# Patient Record
Sex: FEMALE | Race: WHITE | NOT HISPANIC OR LATINO | Employment: FULL TIME | URBAN - METROPOLITAN AREA
[De-identification: names, ages, dates, MRNs, and addresses within clinical notes are randomized per-mention and may not be internally consistent; named-entity substitution may affect disease eponyms.]

---

## 2017-01-24 ENCOUNTER — MYC REFILL (OUTPATIENT)
Dept: FAMILY MEDICINE | Facility: CLINIC | Age: 52
End: 2017-01-24

## 2017-01-24 DIAGNOSIS — F33.0 MAJOR DEPRESSIVE DISORDER, RECURRENT EPISODE, MILD (H): ICD-10-CM

## 2017-01-24 NOTE — TELEPHONE ENCOUNTER
Due for appointment, one month sent, blueKiwi message sent    FUTURE APPOINTMENTS:       - Follow-up visit in 8/2016, arrive fasting for physical/labs  Prescription approved per [facility] Refill Protocol.  Gaby Dey RN, BSN

## 2017-01-24 NOTE — TELEPHONE ENCOUNTER
Message from [employer]:  Original authorizing provider: Susan Haase, APRN CNP Nancy M Pommier would like a refill of the following medications:  FLUoxetine (PROZAC) 20 MG capsule [Susan Haase, APRN CNP]    Preferred pharmacy: [address]    Comment:

## 2017-01-26 ENCOUNTER — DOCUMENTATION ONLY (OUTPATIENT)
Dept: LAB | Facility: CLINIC | Age: 52
End: 2017-01-26

## 2017-01-26 DIAGNOSIS — E03.9 HYPOTHYROIDISM, UNSPECIFIED TYPE: ICD-10-CM

## 2017-01-26 DIAGNOSIS — Z13.6 CARDIOVASCULAR SCREENING; LDL GOAL LESS THAN 160: ICD-10-CM

## 2017-01-26 DIAGNOSIS — E55.9 VITAMIN D DEFICIENCY: Primary | ICD-10-CM

## 2017-01-26 NOTE — PROGRESS NOTES
..Please review, associate diagnosis and sign pending laboratory orders for patient upcoming appointment on 01/27/2017. Thank You.

## 2017-01-27 ENCOUNTER — ALLIED HEALTH/NURSE VISIT (OUTPATIENT)
Dept: NURSING | Facility: CLINIC | Age: 52
End: 2017-01-27
Payer: COMMERCIAL

## 2017-01-27 DIAGNOSIS — Z23 NEED FOR PROPHYLACTIC VACCINATION AND INOCULATION AGAINST INFLUENZA: Primary | ICD-10-CM

## 2017-01-27 DIAGNOSIS — E03.9 HYPOTHYROIDISM, UNSPECIFIED TYPE: ICD-10-CM

## 2017-01-27 DIAGNOSIS — Z13.6 CARDIOVASCULAR SCREENING; LDL GOAL LESS THAN 160: ICD-10-CM

## 2017-01-27 DIAGNOSIS — E55.9 VITAMIN D DEFICIENCY: ICD-10-CM

## 2017-01-27 LAB
BASOPHILS # BLD AUTO: 0 10E9/L (ref 0–0.2)
BASOPHILS NFR BLD AUTO: 0.8 %
DIFFERENTIAL METHOD BLD: NORMAL
EOSINOPHIL # BLD AUTO: 0.2 10E9/L (ref 0–0.7)
EOSINOPHIL NFR BLD AUTO: 4.3 %
ERYTHROCYTE [DISTWIDTH] IN BLOOD BY AUTOMATED COUNT: 12.8 % (ref 10–15)
HCT VFR BLD AUTO: 39.1 % (ref 35–47)
HGB BLD-MCNC: 13.6 G/DL (ref 11.7–15.7)
LYMPHOCYTES # BLD AUTO: 1.4 10E9/L (ref 0.8–5.3)
LYMPHOCYTES NFR BLD AUTO: 29.2 %
MCH RBC QN AUTO: 31.4 PG (ref 26.5–33)
MCHC RBC AUTO-ENTMCNC: 34.8 G/DL (ref 31.5–36.5)
MCV RBC AUTO: 90 FL (ref 78–100)
MONOCYTES # BLD AUTO: 0.5 10E9/L (ref 0–1.3)
MONOCYTES NFR BLD AUTO: 11.1 %
NEUTROPHILS # BLD AUTO: 2.7 10E9/L (ref 1.6–8.3)
NEUTROPHILS NFR BLD AUTO: 54.6 %
PLATELET # BLD AUTO: 223 10E9/L (ref 150–450)
RBC # BLD AUTO: 4.33 10E12/L (ref 3.8–5.2)
WBC # BLD AUTO: 4.9 10E9/L (ref 4–11)

## 2017-01-27 PROCEDURE — 36415 COLL VENOUS BLD VENIPUNCTURE: CPT | Performed by: NURSE PRACTITIONER

## 2017-01-27 PROCEDURE — 90471 IMMUNIZATION ADMIN: CPT

## 2017-01-27 PROCEDURE — 84439 ASSAY OF FREE THYROXINE: CPT | Performed by: NURSE PRACTITIONER

## 2017-01-27 PROCEDURE — 99207 ZZC NO CHARGE NURSE ONLY: CPT

## 2017-01-27 PROCEDURE — 80061 LIPID PANEL: CPT | Performed by: NURSE PRACTITIONER

## 2017-01-27 PROCEDURE — 82306 VITAMIN D 25 HYDROXY: CPT | Performed by: NURSE PRACTITIONER

## 2017-01-27 PROCEDURE — 90686 IIV4 VACC NO PRSV 0.5 ML IM: CPT

## 2017-01-27 PROCEDURE — 80050 GENERAL HEALTH PANEL: CPT | Performed by: NURSE PRACTITIONER

## 2017-01-27 ASSESSMENT — ANXIETY QUESTIONNAIRES
7. FEELING AFRAID AS IF SOMETHING AWFUL MIGHT HAPPEN: SEVERAL DAYS
1. FEELING NERVOUS, ANXIOUS, OR ON EDGE: SEVERAL DAYS
6. BECOMING EASILY ANNOYED OR IRRITABLE: MORE THAN HALF THE DAYS
5. BEING SO RESTLESS THAT IT IS HARD TO SIT STILL: SEVERAL DAYS
3. WORRYING TOO MUCH ABOUT DIFFERENT THINGS: MORE THAN HALF THE DAYS
GAD7 TOTAL SCORE: 10
2. NOT BEING ABLE TO STOP OR CONTROL WORRYING: MORE THAN HALF THE DAYS

## 2017-01-27 ASSESSMENT — PATIENT HEALTH QUESTIONNAIRE - PHQ9: 5. POOR APPETITE OR OVEREATING: SEVERAL DAYS

## 2017-01-27 NOTE — PROGRESS NOTES
Injectable Influenza Immunization Documentation    1.  Is the person to be vaccinated sick today?  No    2. Does the person to be vaccinated have an allergy to eggs or to a component of the vaccine?  No    3. Has the person to be vaccinated today ever had a serious reaction to influenza vaccine in the past?  No    4. Has the person to be vaccinated ever had Guillain-Courtland syndrome?  No     Form completed by Richard Del Castillo CMA

## 2017-01-27 NOTE — PROGRESS NOTES
Quick Note:    Julio C Bowman,  Your CBC (checks for anemia and infection) is normal.  Sincerely,   Susan Haase, RICO  ______

## 2017-01-28 ENCOUNTER — TELEPHONE (OUTPATIENT)
Dept: FAMILY MEDICINE | Facility: CLINIC | Age: 52
End: 2017-01-28

## 2017-01-28 DIAGNOSIS — E03.9 HYPOTHYROIDISM, UNSPECIFIED TYPE: Primary | ICD-10-CM

## 2017-01-28 LAB
ALBUMIN SERPL-MCNC: 3.7 G/DL (ref 3.4–5)
ALP SERPL-CCNC: 55 U/L (ref 40–150)
ALT SERPL W P-5'-P-CCNC: 28 U/L (ref 0–50)
ANION GAP SERPL CALCULATED.3IONS-SCNC: 9 MMOL/L (ref 3–14)
AST SERPL W P-5'-P-CCNC: 17 U/L (ref 0–45)
BILIRUB SERPL-MCNC: 0.6 MG/DL (ref 0.2–1.3)
BUN SERPL-MCNC: 15 MG/DL (ref 7–30)
CALCIUM SERPL-MCNC: 8.6 MG/DL (ref 8.5–10.1)
CHLORIDE SERPL-SCNC: 105 MMOL/L (ref 94–109)
CHOLEST SERPL-MCNC: 161 MG/DL
CO2 SERPL-SCNC: 23 MMOL/L (ref 20–32)
CREAT SERPL-MCNC: 0.75 MG/DL (ref 0.52–1.04)
GFR SERPL CREATININE-BSD FRML MDRD: 82 ML/MIN/1.7M2
GLUCOSE SERPL-MCNC: 97 MG/DL (ref 70–99)
HDLC SERPL-MCNC: 56 MG/DL
LDLC SERPL CALC-MCNC: 96 MG/DL
NONHDLC SERPL-MCNC: 105 MG/DL
POTASSIUM SERPL-SCNC: 4.3 MMOL/L (ref 3.4–5.3)
PROT SERPL-MCNC: 6.8 G/DL (ref 6.8–8.8)
SODIUM SERPL-SCNC: 137 MMOL/L (ref 133–144)
T4 FREE SERPL-MCNC: 1.27 NG/DL (ref 0.76–1.46)
TRIGL SERPL-MCNC: 46 MG/DL
TSH SERPL DL<=0.005 MIU/L-ACNC: 7.85 MU/L (ref 0.4–4)

## 2017-01-28 RX ORDER — LEVOTHYROXINE SODIUM 50 UG/1
50 TABLET ORAL DAILY
Qty: 90 TABLET | Refills: 0 | Status: SHIPPED | OUTPATIENT
Start: 2017-01-28 | End: 2017-03-12

## 2017-01-28 ASSESSMENT — ANXIETY QUESTIONNAIRES: GAD7 TOTAL SCORE: 10

## 2017-01-28 ASSESSMENT — PATIENT HEALTH QUESTIONNAIRE - PHQ9: SUM OF ALL RESPONSES TO PHQ QUESTIONS 1-9: 13

## 2017-01-28 NOTE — TELEPHONE ENCOUNTER
Can you let Gracie know the following:    Your lab results are as below:  1)  TSH (thyroid level) 7.85 which is elevated (range 0.4-4).  Based on this result we need to increase your levothyroxine to 50 mcg every day, a prescription has been sent in.  Please follow up in 3 months for a lab only appointment to have your thyroid level rechecked.

## 2017-01-28 NOTE — PROGRESS NOTES
Quick Note:    Julio C Bowman,  Your lab results are as below:  1) TSH (thyroid level) 7.85 which is elevated (range 0.4-4). Based on this result we need to increase your levothyroxine to 33 mcg every day, a prescription has been sent in. Please follow up in 3 months for a lab only appointment to have your thyroid level rechecked.   2) Cholesterol was normal at 161, your LDL (bad cholesterol) and your HDL (good cholesterol) were within normal range. Continue to follow a low cholesterol diet and we will recheck this in 1 year.  3) Glucose was normal at 97 (normal fasting is <100).    If you have any questions do not hesitate to call the clinic to discuss the results with me further.     Sincerely,    Susan Haase, CNP      ______

## 2017-01-30 LAB — DEPRECATED CALCIDIOL+CALCIFEROL SERPL-MC: 32 UG/L (ref 20–75)

## 2017-01-31 NOTE — PROGRESS NOTES
Quick Note:    Julio C Bowman,  Your vitamin d level was normal at 32.  Sincerely,   Susan Haase, RICO  ______

## 2017-03-10 ENCOUNTER — OFFICE VISIT (OUTPATIENT)
Dept: FAMILY MEDICINE | Facility: CLINIC | Age: 52
End: 2017-03-10
Payer: COMMERCIAL

## 2017-03-10 VITALS
WEIGHT: 152 LBS | TEMPERATURE: 97.7 F | HEIGHT: 62 IN | RESPIRATION RATE: 14 BRPM | HEART RATE: 68 BPM | BODY MASS INDEX: 27.97 KG/M2 | DIASTOLIC BLOOD PRESSURE: 64 MMHG | SYSTOLIC BLOOD PRESSURE: 102 MMHG

## 2017-03-10 DIAGNOSIS — N95.1 MENOPAUSAL FLUSHING: ICD-10-CM

## 2017-03-10 DIAGNOSIS — F33.0 MAJOR DEPRESSIVE DISORDER, RECURRENT EPISODE, MILD (H): Primary | ICD-10-CM

## 2017-03-10 DIAGNOSIS — E03.9 HYPOTHYROIDISM, UNSPECIFIED TYPE: ICD-10-CM

## 2017-03-10 DIAGNOSIS — Z12.11 COLON CANCER SCREENING: ICD-10-CM

## 2017-03-10 PROCEDURE — 36415 COLL VENOUS BLD VENIPUNCTURE: CPT | Performed by: NURSE PRACTITIONER

## 2017-03-10 PROCEDURE — 99214 OFFICE O/P EST MOD 30 MIN: CPT | Performed by: NURSE PRACTITIONER

## 2017-03-10 PROCEDURE — 84443 ASSAY THYROID STIM HORMONE: CPT | Performed by: NURSE PRACTITIONER

## 2017-03-10 RX ORDER — GABAPENTIN 100 MG/1
CAPSULE ORAL
Qty: 90 CAPSULE | Refills: 1 | Status: SHIPPED | OUTPATIENT
Start: 2017-03-10 | End: 2017-10-31

## 2017-03-10 ASSESSMENT — ANXIETY QUESTIONNAIRES
6. BECOMING EASILY ANNOYED OR IRRITABLE: MORE THAN HALF THE DAYS
5. BEING SO RESTLESS THAT IT IS HARD TO SIT STILL: SEVERAL DAYS
GAD7 TOTAL SCORE: 10
7. FEELING AFRAID AS IF SOMETHING AWFUL MIGHT HAPPEN: SEVERAL DAYS
3. WORRYING TOO MUCH ABOUT DIFFERENT THINGS: MORE THAN HALF THE DAYS
2. NOT BEING ABLE TO STOP OR CONTROL WORRYING: MORE THAN HALF THE DAYS
1. FEELING NERVOUS, ANXIOUS, OR ON EDGE: SEVERAL DAYS

## 2017-03-10 ASSESSMENT — PATIENT HEALTH QUESTIONNAIRE - PHQ9: 5. POOR APPETITE OR OVEREATING: SEVERAL DAYS

## 2017-03-10 NOTE — PROGRESS NOTES
"  SUBJECTIVE:                                                    Gracie Aranda is a 51 year old female who presents to clinic today for the following health issues:      History of Present Illness   Depression & Anxiety Follow-up:     Depression/Anxiety:  Depression only    Status since last visit::  Improved    Other associated symptoms of depression::  None    Significant life event::  No    Current substance use::  None    Anxiety/Panic symptoms::  No  Diet::  Regular (no restrictions)  Taking medications regularly::  Yes  Medication side effects::  None  Additional concerns today::  No  Answers for HPI/ROS submitted by the patient on 3/7/2017   Q1: Little interest or pleasure in doing things: 0=Not at all  Q2: Feeling down, depressed or hopeless: 0=Not at all  PHQ-2 Score: 0    Gracie requests refill on fluoxetine (20mg) daily. She ran out of previous prescription about 4 days ago.   She is no longer going to counseling, was not impressed and found no benefit. Also states \"things\" improving at work. Denies thoughts of harming self or others.    PHQ-9: 9  SUSANNA-7: 10    Hot Flashes -- Gracie reports difficulty sleeping due to hot flashes. Hot flashes began about 2 weeks ago and occur in daytime and nighttime. She has been waking up 3-4x per night feeling hot and flush. Hot flashes began prior too running out of fluoxetine.     GYN -- Notes changes in period. Skipped period one month. Most recently periods have only been lasting 1 day.    Thyroid -- Currently taking  levothyroxine daily. Would like refill. Last TSH was 7.85 in 1/2017, will recheck today.    Social -- Reports new friend taking her on a [employer] cruise over spring break.    Problem list and histories reviewed & adjusted, as indicated.  Additional history: as documented    Patient Active Problem List   Diagnosis     Hypothyroidism     Mild major depression (H)     Vitamin D deficiency     CARDIOVASCULAR SCREENING; LDL GOAL LESS THAN 160     " "Hypocalcemia     Past Surgical History   Procedure Laterality Date     No history of surgery         Social History   Substance Use Topics     Smoking status: Never Smoker     Smokeless tobacco: Never Used     Alcohol use Yes      Comment: rare     Family History   Problem Relation Age of Onset     CANCER Mother      brain tumor     Depression Sister      DIABETES Maternal Aunt      DIABETES Maternal Aunt          Current Outpatient Prescriptions   Medication Sig Dispense Refill     gabapentin (NEURONTIN) 100 MG capsule Take 100 mg at night for 3-5 nights, increase to 200 mg at night for 3-5 night, increase to 300 mg at night 90 capsule 1     FLUoxetine (PROZAC) 20 MG capsule Take 1 capsule (20 mg) by mouth daily 90 capsule 1     levothyroxine (SYNTHROID/LEVOTHROID) 50 MCG tablet Take 1 tablet (50 mcg) by mouth daily 90 tablet 0     multivitamin, therapeutic with minerals (MULTI-VITAMIN) TABS Take 1 tablet by mouth daily       [DISCONTINUED] FLUoxetine (PROZAC) 20 MG capsule Take 1 capsule (20 mg) by mouth daily 30 capsule 0     Allergies   Allergen Reactions     No Known Drug Allergies        ROS:  Constitutional, HEENT, cardiovascular, pulmonary, GI, , musculoskeletal, neuro, skin, endocrine and psych systems are negative, except as otherwise noted.    This document serves as a record of the services and decisions personally performed and made by Susan Haase, CNP. It was created on her behalf by Alva Garduno, a trained medical scribe. The creation of this document is based the provider's statements to the medical scribe.  Alva Garduno March 10, 2017 10:41 AM     OBJECTIVE:                                                    /64 (BP Location: Left arm, Patient Position: Chair, Cuff Size: Adult Regular)  Pulse 68  Temp 97.7  F (36.5  C) (Oral)  Resp 14  Ht 1.575 m (5' 2\")  Wt 68.9 kg (152 lb)  BMI 27.8 kg/m2  Body mass index is 27.8 kg/(m^2).  GENERAL: healthy, alert and no distress  HENT: ear canals " and TM's normal, nose and mouth without ulcers or lesions  NECK: no adenopathy, no asymmetry, masses, or scars and thyroid normal to palpation  RESP: lungs clear to auscultation - no rales, rhonchi or wheezes  CV: regular rate and rhythm, normal S1 S2, no S3 or S4, no murmur, click or rub, no peripheral edema  NEURO: Normal strength and tone, mentation intact and speech normal  PSYCH: mentation appears normal, affect normal/bright       ASSESSMENT/PLAN:                                                    Gracie was seen today for recheck medication and pre visit planning - done.    Diagnoses and all orders for this visit:    Major depressive disorder, recurrent episode, mild (H):  Well controlled, refill fluoxetine.  -     FLUoxetine (PROZAC) 20 MG capsule; Take 1 capsule (20 mg) by mouth daily    Hypothyroidism, unspecified type: recheck TSH if normal, continue on levothyroxine at 50 mcg every day, will call if dose change is needed.  -     TSH with free T4 reflex    Menopausal flushing  -     gabapentin (NEURONTIN) 100 MG capsule; Take 100 mg at night for 3-5 nights, increase to 200 mg at night for 3-5 night, increase to 300 mg at night    Colon cancer screening  -     Fecal colorectal cancer screen (FIT); Future    Other orders  -     DEPRESSION ACTION PLAN (DAP)  -     Cancel: GASTROENTEROLOGY ADULT REF PROCEDURE ONLY        Patient Instructions     Begin taking gabapentin as prescribed.    Follow up in 6 months.      The information in this document, created by the medical scribe for me, accurately reflects the services I personally performed and the decisions made by me. I have reviewed and approved this document for accuracy.   Susan Haase, CNP Susan Haase, APRN CNP  [facility]

## 2017-03-10 NOTE — NURSING NOTE
"Chief Complaint   Patient presents with     Recheck Medication     Pre Visit Planning - Done       Initial /64 (BP Location: Left arm, Patient Position: Chair, Cuff Size: Adult Regular)  Pulse 68  Temp 97.7  F (36.5  C) (Oral)  Resp 14  Ht 5' 2\" (1.575 m)  Wt 152 lb (68.9 kg)  BMI 27.8 kg/m2 Estimated body mass index is 27.8 kg/(m^2) as calculated from the following:    Height as of this encounter: 5' 2\" (1.575 m).    Weight as of this encounter: 152 lb (68.9 kg).  Medication Reconciliation: complete   Linda Stevens CMA      "

## 2017-03-10 NOTE — MR AVS SNAPSHOT
After Visit Summary   3/10/2017    Gracie Aranda    MRN: [id number]           Patient Information     Date Of Birth          1965        Visit Information        Provider Department      3/10/2017 11:00 AM Haase, Susan Rachele, APRN CNP [facility]        Today's Diagnoses     Hypothyroidism, unspecified type    -  1    Major depressive disorder, recurrent episode, mild (H)        Menopausal flushing        Colon cancer screening          Care Instructions      Begin taking gabapentin as prescribed.    Follow up in 6 months.        Follow-ups after your visit        Future tests that were ordered for you today     Open Future Orders        Priority Expected Expires Ordered    Fecal colorectal cancer screen (FIT) Routine 3/31/2017 6/2/2017 3/10/2017            Who to contact     If you have questions or need follow up information about today's clinic visit or your schedule please contact [facility] directly at [phone].  Normal or non-critical lab and imaging results will be communicated to you by MyChart, letter or phone within 4 business days after the clinic has received the results. If you do not hear from us within 7 days, please contact the clinic through [email or web address] or phone. If you have a critical or abnormal lab result, we will notify you by phone as soon as possible.  Submit refill requests through [employer] or call your pharmacy and they will forward the refill request to us. Please allow 3 business days for your refill to be completed.          Additional Information About Your Visit        MyChart Information     [employer] gives you secure access to your electronic health record. If you see a primary care provider, you can also send messages to your care team and make appointments. If you have questions, please call your primary care clinic.  If you do not have a primary care provider, please call [phone] and they will assist you.        Care  "EveryWhere ID     This is your Care EveryWhere ID. This could be used by other organizations to access your [facility] medical records  [id number]        Your Vitals Were     Pulse Temperature Respirations Height BMI (Body Mass Index)       68 97.7  F (36.5  C) (Oral) 14 5' 2\" (1.575 m) 27.8 kg/m2        Blood Pressure from Last 3 Encounters:   03/10/17 102/64   04/18/16 108/66   02/19/16 102/70    Weight from Last 3 Encounters:   03/10/17 152 lb (68.9 kg)   04/18/16 152 lb (68.9 kg)   02/19/16 150 lb 6.4 oz (68.2 kg)              We Performed the Following     DEPRESSION ACTION PLAN (DAP)     TSH with free T4 reflex          Today's Medication Changes          These changes are accurate as of: 3/10/17 11:19 AM.  If you have any questions, ask your nurse or doctor.               Start taking these medicines.        Dose/Directions    gabapentin 100 MG capsule   Commonly known as:  NEURONTIN   Used for:  Menopausal flushing   Started by:  Haase, Susan Rachele, APRN CNP        Take 100 mg at night for 3-5 nights, increase to 200 mg at night for 3-5 night, increase to 300 mg at night   Quantity:  90 capsule   Refills:  1            Where to get your medicines      These medications were sent to [facility] [address] [place] [address]     Phone:  [phone]     FLUoxetine 20 MG capsule    gabapentin 100 MG capsule                Primary Care Provider Office Phone # Fax #    Susan Rachele Haase, APRN [phone]       [facility] [address] [place] [address]        Thank you!     Thank you for choosing [facility]  for your care. Our goal is always to provide you with excellent care. Hearing back from our patients is one way we can continue to improve our services. Please take a few minutes to complete the written survey that you may receive in the mail after your visit with us. Thank " you!             Your Updated Medication List - Protect others around you: Learn how to safely use, store and throw away your medicines at www.disposemymeds.org.          This list is accurate as of: 3/10/17 11:19 AM.  Always use your most recent med list.                   Brand Name Dispense Instructions for use    FLUoxetine 20 MG capsule    PROzac    90 capsule    Take 1 capsule (20 mg) by mouth daily       gabapentin 100 MG capsule    NEURONTIN    90 capsule    Take 100 mg at night for 3-5 nights, increase to 200 mg at night for 3-5 night, increase to 300 mg at night       levothyroxine 50 MCG tablet    SYNTHROID/LEVOTHROID    90 tablet    Take 1 tablet (50 mcg) by mouth daily       Multi-vitamin Tabs tablet      Take 1 tablet by mouth daily

## 2017-03-11 LAB — TSH SERPL DL<=0.005 MIU/L-ACNC: 1.55 MU/L (ref 0.4–4)

## 2017-03-11 ASSESSMENT — ANXIETY QUESTIONNAIRES: GAD7 TOTAL SCORE: 10

## 2017-03-11 ASSESSMENT — PATIENT HEALTH QUESTIONNAIRE - PHQ9: SUM OF ALL RESPONSES TO PHQ QUESTIONS 1-9: 9

## 2017-03-12 RX ORDER — LEVOTHYROXINE SODIUM 50 UG/1
50 TABLET ORAL DAILY
Qty: 90 TABLET | Refills: 3 | Status: SHIPPED | OUTPATIENT
Start: 2017-03-12 | End: 2017-04-25

## 2017-03-12 NOTE — PROGRESS NOTES
Julio C Bowman,  Your thyroid level was normal at 1.55, continue on your current dosage of levothyroxine.  Have fun on your cruise!  Sincerely,     Susan Haase

## 2017-04-19 PROCEDURE — 82274 ASSAY TEST FOR BLOOD FECAL: CPT | Performed by: NURSE PRACTITIONER

## 2017-04-21 DIAGNOSIS — Z12.11 COLON CANCER SCREENING: ICD-10-CM

## 2017-04-21 LAB — HEMOCCULT STL QL IA: NEGATIVE

## 2017-04-22 NOTE — PROGRESS NOTES
Julio C Bowman,  Thank you for returning your stool test.  It was negative.  We suggest that this test be completed every year.  Please let me know if you have any questions.  Sincerely,  Susan Haase, CNP

## 2017-04-25 ENCOUNTER — MYC REFILL (OUTPATIENT)
Dept: FAMILY MEDICINE | Facility: CLINIC | Age: 52
End: 2017-04-25

## 2017-04-25 DIAGNOSIS — E03.9 HYPOTHYROIDISM, UNSPECIFIED TYPE: ICD-10-CM

## 2017-04-25 RX ORDER — LEVOTHYROXINE SODIUM 50 UG/1
50 TABLET ORAL DAILY
Qty: 90 TABLET | Refills: 3 | Status: SHIPPED | OUTPATIENT
Start: 2017-04-25 | End: 2018-03-28

## 2017-04-25 NOTE — TELEPHONE ENCOUNTER
See request, has refills, sent mychart response    TSH   Date Value Ref Range Status   03/10/2017 1.55 0.40 - 4.00 mU/L Final   ]  Prescription approved per [facility] Refill Protocol.  Gaby Dey, RN, BSN

## 2017-04-25 NOTE — TELEPHONE ENCOUNTER
Message from [employer]:  Original authorizing provider: Susan Haase, APRN CNP Nancy M. Pommier would like a refill of the following medications:  levothyroxine (SYNTHROID/LEVOTHROID) 50 MCG tablet [Susan Haase, APRN CNP]    Preferred pharmacy: [address]    Comment:

## 2017-10-31 ENCOUNTER — OFFICE VISIT (OUTPATIENT)
Dept: FAMILY MEDICINE | Facility: CLINIC | Age: 52
End: 2017-10-31
Payer: COMMERCIAL

## 2017-10-31 VITALS
HEART RATE: 68 BPM | BODY MASS INDEX: 28.35 KG/M2 | RESPIRATION RATE: 14 BRPM | WEIGHT: 155 LBS | DIASTOLIC BLOOD PRESSURE: 74 MMHG | TEMPERATURE: 97.7 F | SYSTOLIC BLOOD PRESSURE: 108 MMHG | OXYGEN SATURATION: 98 %

## 2017-10-31 DIAGNOSIS — E03.9 HYPOTHYROIDISM, UNSPECIFIED TYPE: ICD-10-CM

## 2017-10-31 DIAGNOSIS — G47.9 SLEEP DISTURBANCE: ICD-10-CM

## 2017-10-31 DIAGNOSIS — F33.0 MAJOR DEPRESSIVE DISORDER, RECURRENT EPISODE, MILD (H): Primary | ICD-10-CM

## 2017-10-31 DIAGNOSIS — N95.1 MENOPAUSAL FLUSHING: ICD-10-CM

## 2017-10-31 DIAGNOSIS — E55.9 VITAMIN D DEFICIENCY: ICD-10-CM

## 2017-10-31 LAB
DEPRECATED CALCIDIOL+CALCIFEROL SERPL-MC: 30 UG/L (ref 20–75)
HBA1C MFR BLD: 5.1 % (ref 4.3–6)

## 2017-10-31 PROCEDURE — 82306 VITAMIN D 25 HYDROXY: CPT | Performed by: NURSE PRACTITIONER

## 2017-10-31 PROCEDURE — 83036 HEMOGLOBIN GLYCOSYLATED A1C: CPT | Performed by: NURSE PRACTITIONER

## 2017-10-31 PROCEDURE — 99214 OFFICE O/P EST MOD 30 MIN: CPT | Performed by: NURSE PRACTITIONER

## 2017-10-31 PROCEDURE — 80048 BASIC METABOLIC PNL TOTAL CA: CPT | Performed by: NURSE PRACTITIONER

## 2017-10-31 PROCEDURE — 84443 ASSAY THYROID STIM HORMONE: CPT | Performed by: NURSE PRACTITIONER

## 2017-10-31 PROCEDURE — 36415 COLL VENOUS BLD VENIPUNCTURE: CPT | Performed by: NURSE PRACTITIONER

## 2017-10-31 ASSESSMENT — ANXIETY QUESTIONNAIRES
3. WORRYING TOO MUCH ABOUT DIFFERENT THINGS: SEVERAL DAYS
6. BECOMING EASILY ANNOYED OR IRRITABLE: NOT AT ALL
GAD7 TOTAL SCORE: 1
5. BEING SO RESTLESS THAT IT IS HARD TO SIT STILL: NOT AT ALL
7. FEELING AFRAID AS IF SOMETHING AWFUL MIGHT HAPPEN: NOT AT ALL
2. NOT BEING ABLE TO STOP OR CONTROL WORRYING: NOT AT ALL
1. FEELING NERVOUS, ANXIOUS, OR ON EDGE: NOT AT ALL

## 2017-10-31 ASSESSMENT — PATIENT HEALTH QUESTIONNAIRE - PHQ9
5. POOR APPETITE OR OVEREATING: NOT AT ALL
SUM OF ALL RESPONSES TO PHQ QUESTIONS 1-9: 7

## 2017-10-31 NOTE — PROGRESS NOTES
Julio C Bowman,  Your A1C (test for diabetes) was normal at 5.1%, diabetes is diagnosed if the A1C is >6.5%  Sincerely,     Susan Haase, CNP

## 2017-10-31 NOTE — NURSING NOTE
"Chief Complaint   Patient presents with     Recheck Medication       Initial /74 (BP Location: Right arm, Patient Position: Chair, Cuff Size: Adult Regular)  Pulse 68  Temp 97.7  F (36.5  C) (Oral)  Resp 14  Wt 155 lb (70.3 kg)  SpO2 98%  BMI 28.35 kg/m2 Estimated body mass index is 28.35 kg/(m^2) as calculated from the following:    Height as of 3/10/17: 5' 2\" (1.575 m).    Weight as of this encounter: 155 lb (70.3 kg).  Medication Reconciliation: complete   Linda Stevens CMA      "

## 2017-10-31 NOTE — MR AVS SNAPSHOT
"              After Visit Summary   10/31/2017    Gracie Aranda    MRN: [id number]           Patient Information     Date Of Birth          1965        Visit Information        Provider Department      10/31/2017 11:30 AM Haase, Susan Rachele, APRN CNP [facility]        Today's Diagnoses     Hypothyroidism, unspecified type    -  1    Mild major depression (H)        Vitamin D deficiency        Major depressive disorder, recurrent episode, mild (H)           Follow-ups after your visit        Follow-up notes from your care team     Return in about 6 months (around 4/30/2018).      Your next 10 appointments already scheduled     Nov 09, 2017 11:30 AM CST   MA SCREENING DIGITAL BILATERAL with [facility]MA1   [facility]    [id number] [place] [address] [phone]           Do not use any powder, lotion or deodorant under your arms or on your breast. If you do, we will ask you to remove it before your exam.  Wear comfortable, two-piece clothing.  If you have any allergies, tell your care team.  Bring any previous mammograms from other facilities or have them mailed to the breast center. Three-dimensional (3D) mammograms are available at [facility] locations in [facility], [facility], [facility], [place], [place], and [place]. [facility] locations include [place] and [facility] in [place]. Benefits of 3D mammograms include: - Improved rate of cancer detection - Decreases your chance of having to go back for more tests, which means fewer: - \"False-positive\" results (This means that there is an abnormal area but it isn't cancer.) - Invasive testing procedures, such as a biopsy or surgery - Can provide clearer images of the breast if you have dense breast tissue. 3D mammography is an optional exam that anyone can have with a 2D mammogram. It doesn't replace or take the place of a 2D " mammogram. 2D mammograms remain an effective screening test for all women.  Not all insurance companies cover the cost of a 3D mammogram. Check with your insurance.              Who to contact     If you have questions or need follow up information about today's clinic visit or your schedule please contact [facility] directly at [phone].  Normal or non-critical lab and imaging results will be communicated to you by MyChart, letter or phone within 4 business days after the clinic has received the results. If you do not hear from us within 7 days, please contact the clinic through [employer] or phone. If you have a critical or abnormal lab result, we will notify you by phone as soon as possible.  Submit refill requests through [employer] or call your pharmacy and they will forward the refill request to us. Please allow 3 business days for your refill to be completed.          Additional Information About Your Visit        [employer] Information     [employer] gives you secure access to your electronic health record. If you see a primary care provider, you can also send messages to your care team and make appointments. If you have questions, please call your primary care clinic.  If you do not have a primary care provider, please call [phone] and they will assist you.        Care EveryWhere ID     This is your Care EveryWhere ID. This could be used by other organizations to access your [facility] medical records  [id number]        Your Vitals Were     Pulse Temperature Respirations Pulse Oximetry BMI (Body Mass Index)       68 97.7  F (36.5  C) (Oral) 14 98% 28.35 kg/m2        Blood Pressure from Last 3 Encounters:   10/31/17 108/74   03/10/17 102/64   04/18/16 108/66    Weight from Last 3 Encounters:   10/31/17 155 lb (70.3 kg)   03/10/17 152 lb (68.9 kg)   04/18/16 152 lb (68.9 kg)              We Performed the Following     Basic metabolic panel     Hemoglobin A1c     TSH with free T4 reflex      Vitamin D Deficiency          Where to get your medicines      These medications were sent to [facility] [address] [facility] [address]  [address] [facility] [address]     Phone:  [phone]     FLUoxetine 20 MG capsule          Primary Care Provider Office Phone # Fax #    Susan Rachele Haase, APRN [phone] [address]  [facility] [address]        Equal Access to Services     LILIA CONRAD : Hadii aad ku hadasho Soomaali, waaxda luqadaha, qaybta kaalmada adeegyada, waxay idiin hayaan adeeg paarasalbador laabad . So [facility] [phone].    ATENCIÓN: Si habla español, tiene a duncan disposición servicios gratuitos de asistencia lingüística. [facility] [phone].    We comply with applicable federal civil rights laws and [place] laws. We do not discriminate on the basis of race, color, national origin, age, disability, sex, sexual orientation, or gender identity.            Thank you!     Thank you for choosing [facility]  for your care. Our goal is always to provide you with excellent care. Hearing back from our patients is one way we can continue to improve our services. Please take a few minutes to complete the written survey that you may receive in the mail after your visit with us. Thank you!             Your Updated Medication List - Protect others around you: Learn how to safely use, store and throw away your medicines at www.disposemymeds.org.          This list is accurate as of: 10/31/17 11:42 AM.  Always use your most recent med list.                   Brand Name Dispense Instructions for use Diagnosis    FLUoxetine 20 MG capsule    PROzac    90 capsule    Take 1 capsule (20 mg) by mouth daily    Major depressive disorder, recurrent episode, mild (H)       levothyroxine 50 MCG tablet    SYNTHROID/LEVOTHROID    90 tablet    Take 1 tablet (50 mcg) by mouth daily    Hypothyroidism, unspecified type       Multi-vitamin Tabs tablet       Take 1 tablet by mouth daily

## 2017-10-31 NOTE — PROGRESS NOTES
SUBJECTIVE:   Gracie Aranda is a 52 year old female who presents to clinic today for the following health issues:    Depression Followup    Status since last visit: Improved     See PHQ-9 for current symptoms.  Other associated symptoms: None    Complicating factors:   Significant life event:  Yes-  Vacation and made a new friend   Current substance abuse:  None  Anxiety or Panic symptoms:  No    PHQ-9 Score and MyChart F/U Questions 1/27/2017 3/10/2017 10/31/2017   Total Score 13 9 7   Q9: Suicide Ideation Several days Several days Not at all   Gracie feels like she is doing much better recently. She is continuing to take Prozac (20 mg).    PHQ9 score: 7  GAD7 score: 1    PHQ-9  English  PHQ-9   Any Language  Suicide Assessment Five-step Evaluation and Treatment (SAFE-T)  Hypothyroidism Follow-up      Since last visit, patient describes the following symptoms: Weight stable, no hair loss, no skin changes, no constipation, no loose stools    Amount of exercise or physical activity: 1 day/week for an average of 45-60 minutes    Problems taking medications regularly: No    Medication side effects: none    Diet: regular (no restrictions)      OB/GYN: Gracie is only getting her period every few months, no further hot flashes, has stopped gabapentin.    Sleep disturbance:  Gracie complains that she has been waking up at night hungry. She notes that if she doesn't eat when she's hungry she gets headaches. She is concerned about gaining more weight. She denies feeling abnormally thirsty or having increased urination.    Other than her hunger waking her up, Gracie does not have any problems sleeping.      Problem list and histories reviewed & adjusted, as indicated.  Additional history: as documented    Reviewed and updated as needed this visit by clinical staff  Tobacco  Allergies  Med Hx  Surg Hx  Fam Hx  Soc Hx      Reviewed and updated as needed this visit by Provider       ROS:  Constitutional, cardiovascular,  pulmonary and psych systems are negative, except as otherwise noted.    This document serves as a record of the services and decisions personally performed and made by Susan Haase, CNP. It was created on her behalf by León Guardado, a trained medical scribe. The creation of this document is based the provider's statements to the medical scribe.  León Guardado October 31, 2017 11:32 AM      OBJECTIVE:   /74 (BP Location: Right arm, Patient Position: Chair, Cuff Size: Adult Regular)  Pulse 68  Temp 97.7  F (36.5  C) (Oral)  Resp 14  Wt 70.3 kg (155 lb)  SpO2 98%  BMI 28.35 kg/m2  Body mass index is 28.35 kg/(m^2).  GENERAL: healthy, alert and no distress  HENT: ear canals and TM's normal, nose and mouth without ulcers or lesions  NECK: no adenopathy, no asymmetry, masses, or scars and thyroid normal to palpation  RESP: lungs clear to auscultation - no rales, rhonchi or wheezes  CV: regular rate and rhythm, normal S1 S2, no S3 or S4, no murmur, click or rub, no peripheral edema   PSYCH: mentation appears normal, affect normal/bright    ASSESSMENT/PLAN:     Gracie was seen today for recheck medication.    Diagnoses and all orders for this visit:    Major depressive disorder, recurrent episode, mild (H): well controlled PHQ 9 score of 7.    -     FLUoxetine (PROZAC) 20 MG capsule; Take 1 capsule (20 mg) by mouth daily    Hypothyroidism, unspecified type  -     Basic metabolic panel  -     TSH with free T4 reflex  -     Hemoglobin A1c    Menopausal flushing  Resolved, gabapentin discontinued.     Sleep disturbance:  discussed eating more complex carbohydrate and protein meal at supper.      Vitamin D deficiency  -     Vitamin D Deficiency    Follow up in 6 months, sooner as needed.   The information in this document, created by the medical scribe for me, accurately reflects the services I personally performed and the decisions made by me. I have reviewed and approved this document for accuracy.   Susan Haase, CNP      Susan Haase, APRN [facility]

## 2017-11-01 LAB
ANION GAP SERPL CALCULATED.3IONS-SCNC: 11 MMOL/L (ref 3–14)
BUN SERPL-MCNC: 22 MG/DL (ref 7–30)
CALCIUM SERPL-MCNC: 8.7 MG/DL (ref 8.5–10.1)
CHLORIDE SERPL-SCNC: 106 MMOL/L (ref 94–109)
CO2 SERPL-SCNC: 22 MMOL/L (ref 20–32)
CREAT SERPL-MCNC: 0.77 MG/DL (ref 0.52–1.04)
GFR SERPL CREATININE-BSD FRML MDRD: 79 ML/MIN/1.7M2
GLUCOSE SERPL-MCNC: 90 MG/DL (ref 70–99)
POTASSIUM SERPL-SCNC: 4.9 MMOL/L (ref 3.4–5.3)
SODIUM SERPL-SCNC: 139 MMOL/L (ref 133–144)
TSH SERPL DL<=0.005 MIU/L-ACNC: 2.14 MU/L (ref 0.4–4)

## 2017-11-01 ASSESSMENT — ANXIETY QUESTIONNAIRES: GAD7 TOTAL SCORE: 1

## 2017-11-02 NOTE — PROGRESS NOTES
Julio C Bowman,  Your TSH (thyroid level) was normal at 2.14.  Your glucose was also normal at 90. Please let me know if you have any questions.  Sincerely,  Susan Haase, CNP

## 2017-11-09 ENCOUNTER — RADIANT APPOINTMENT (OUTPATIENT)
Dept: MAMMOGRAPHY | Facility: CLINIC | Age: 52
End: 2017-11-09
Payer: COMMERCIAL

## 2017-11-09 DIAGNOSIS — Z12.31 VISIT FOR SCREENING MAMMOGRAM: ICD-10-CM

## 2017-11-09 PROCEDURE — G0202 SCR MAMMO BI INCL CAD: HCPCS | Mod: TC

## 2018-01-15 ENCOUNTER — APPOINTMENT (OUTPATIENT)
Dept: GENERAL RADIOLOGY | Facility: CLINIC | Age: 53
End: 2018-01-15
Attending: EMERGENCY MEDICINE
Payer: COMMERCIAL

## 2018-01-15 ENCOUNTER — HOSPITAL ENCOUNTER (EMERGENCY)
Facility: CLINIC | Age: 53
Discharge: HOME | End: 2018-01-15
Attending: EMERGENCY MEDICINE | Admitting: EMERGENCY MEDICINE
Payer: COMMERCIAL

## 2018-01-15 VITALS
TEMPERATURE: 97.7 F | RESPIRATION RATE: 16 BRPM | OXYGEN SATURATION: 99 % | SYSTOLIC BLOOD PRESSURE: 117 MMHG | DIASTOLIC BLOOD PRESSURE: 84 MMHG

## 2018-01-15 DIAGNOSIS — S63.284A DISLOCATION OF PROXIMAL INTERPHALANGEAL JOINT OF RIGHT RING FINGER, INITIAL ENCOUNTER: ICD-10-CM

## 2018-01-15 DIAGNOSIS — S62.614A CLOSED DISPLACED FRACTURE OF PROXIMAL PHALANX OF RIGHT RING FINGER, INITIAL ENCOUNTER: ICD-10-CM

## 2018-01-15 PROCEDURE — 73140 X-RAY EXAM OF FINGER(S): CPT | Mod: RT

## 2018-01-15 PROCEDURE — 40000986 XR FINGER RT G/E 2 VW: Mod: RT

## 2018-01-15 PROCEDURE — 26742 TREAT FINGER FRACTURE EACH: CPT | Mod: F3

## 2018-01-15 PROCEDURE — 99285 EMERGENCY DEPT VISIT HI MDM: CPT | Mod: 25

## 2018-01-15 RX ORDER — BUPIVACAINE HYDROCHLORIDE 5 MG/ML
INJECTION, SOLUTION PERINEURAL
Status: DISCONTINUED
Start: 2018-01-15 | End: 2018-01-15 | Stop reason: HOSPADM

## 2018-01-15 ASSESSMENT — ENCOUNTER SYMPTOMS
NUMBNESS: 0
HEADACHES: 0

## 2018-01-15 NOTE — ED NOTES
Patient discharged to home. Patient received follow-up instructions with [facility]. Patient received discharge instructions and has no other questions at this time.

## 2018-01-15 NOTE — DISCHARGE INSTRUCTIONS
Finger Dislocation  A finger dislocation occurs when the tissues, or ligaments, that hold the joint together are torn. The bones then move apart, or are dislocated, out of their normal position. This causes pain, swelling, and bruising. Sometimes there is also a small chip fracture. Once the joint is put back into place again, it will take about 6 weeks for the ligaments to heal. During this time, you should protect your finger from re-injury.     Buddy taping is a way to secure your injured finger to the one next to it. Buddy taping allows the joint to move. But it also protects it from dislocating again. Buddy tape can be left in place for up to 6 weeks.  Hand exercises may be prescribed at your follow-up visit. These can help speed healing and maintain function. In most cases you will regain full function of your finger. But it may take 12 to 18 months before all mild pain and swelling goes away and full function returns.  Home care    Keep your hand raised, or elevated, to reduce pain and swelling. When sitting or lying down, raise your arm above the level of your heart. You can do this by placing your arm on a pillow that rests on your chest. Or your arm can be on a pillow at your side. This is most important during the first 48 hours after injury.    Put an ice pack on the injured area for no more than 15 to 20 minutes every 3 to 6 hours. Do this for the first 24 to 48 hours. You can make an ice pack by wrapping a plastic Ziploc bag of ice cubes in a thin towel. As the ice melts, be careful that the tape, gauze, or splint doesn t get wet. After that, keep using ice as needed to ease pain and swelling.    If you have a removable splint, you may take it off to bathe and then put it back on. If you have a permanent splint, cover your entire hand with 2 plastic bags. Place 1 bag around the other. Tape each bag with duct tape at the top end. Water can still leak in even when your hand is covered. So it's best to  keep the splint away from water. If a splint gets wet, you can dry it with a hair-dryer on a cool setting.     If you use jeannie tape and it becomes wet or dirty, change it. You may replace it with paper, plastic, or cloth tape. Cloth tape and paper tapes must be kept dry. When re-applying jeannie tape, use gauze or cotton padding between your fingers. This will prevent the skin from getting moist and breaking down, or macerating. It is very important to put padding at the web space. This is the small piece of skin that joins the bases of your fingers. Keep the jeannie tape in place as instructed by your healthcare provider.    You may use over-the-counter pain medicine to control pain, unless another pain medicine was prescribed. Talk with your provider before taking these medicines if you have chronic liver or kidney disease. Also talk with your provider if you have ever had a stomach ulcer or GI (gastrointestinal) bleeding.    Do not play sports or do any physical exercise until your healthcare provider says that you can.  Follow-up care  Follow up with your healthcare provider in 1 week, or as advised. Splints should generally not be left in place longer than 3 weeks to avoid stiffness and loss of joint function. It is important that you see the referral doctor. This provider can determine how long to keep your splint in place and when to begin hand exercises.  If X-rays were taken, you will be told of any new findings that may affect your care.  When to seek medical advice  Call your healthcare provider right away if any of the following occur:    The injured finger has more pain or swelling    The injured finger becomes red or warm    The injured finger becomes cold, blue, numb, or tingly  Date Last Reviewed: 11/23/2015 2000-2017 [employer]. [address] [place] [address], [place] [address]. All rights reserved. This information is not intended as a substitute for professional medical care. Always follow  your healthcare professional's instructions.          Finger Fracture, Closed  You have a broken finger (fracture). This causes local pain, swelling, and bruising. This injury usually takes about 4 to 6 weeks to heal, but can take longer in some cases. Finger injuries are often treated with a splint or cast, or by taping the injured finger to the next one (jeannie taping). This protects the injured finger and holds the bone in position while it heals. More serious fractures may need surgery.     If the fingernail has been severely injured, it will probably fall off in 1 to 2 weeks. A new fingernail will usually start to grow back within a month.  Home care  Follow these guidelines when caring for yourself at home:    Keep your hand elevated to reduce pain and swelling. When sitting or lying down keep your arm above the level of your heart. You can do this by placing your arm on a pillow that rests on your chest or on a pillow at your side. This is most important during the first 2 days (48 hours) after the injury.    Put an ice pack on the injured area. Do this for 20 minutes every 1 to 2 hours the first day for pain relief. You can make an ice pack by wrapping a plastic bag of ice cubes in a thin towel. As the ice melts, be careful that the cast or splint doesn t get wet. Continue using the ice pack 3 to 4 times a day until the pain and swelling go away.    Keep the cast or splint completely dry at all times. Bathe with your cast or splint out of the water. Protect it with a large plastic bag, rubber-banded at the top end. If a fiberglass cast or splint gets wet, you can dry it with a hair dryer.    If jeannie tape was put on and it becomes wet or dirty, change it. You may replace it with paper, plastic, or cloth tape. Cloth tape and paper tapes must be kept dry. Keep the jeannie tape in place for at least 4 weeks.    You may use acetaminophen or ibuprofen to control pain, unless another pain medicine was prescribed. If  you have chronic liver or kidney disease, talk with your healthcare provider before using these medicines. Also talk with your provider if you ve had a stomach ulcer or gastrointestinal bleeding.    Don t put creams or objects under the cast if you have itching.  Follow-up care  Follow up with your healthcare provider, or as advised. This is to make sure the bone is healing the way it should.  X-rays may be taken. You will be told of any new findings that may affect your care.  When to seek medical advice  Call your healthcare provider right away if any of these occur:    The plaster cast or splint becomes wet or soft    The cast or splint cracks    The fiberglass cast or splint stays wet for more than 24 hours    Pain or swelling gets worse    Redness, warmth, swelling, drainage from the wound, or foul odor from a cast or splint    Finger becomes more cold, blue, numb, or tingly    You can t move your finger    The skin around the cast or splint becomes red    Fever of 100.4 F (38 C) or higher, or as directed by your healthcare provider  Date Last Reviewed: 2/1/2017 2000-2017 [employer]. [address] [place] [id number]. All rights reserved. This information is not intended as a substitute for professional medical care. Always follow your healthcare professional's instructions.

## 2018-01-15 NOTE — ED PROVIDER NOTES
History     Chief Complaint:  Hand injury    HPI   Gracie Aranda is a 52 year old right handed female who presents with a hand injury. The patient states that she was at home this morning when she tripped on the stairs. While falling she caught herself with her right handed, injuring her ring finger. There is an obvious deformity to the finger, but the patient denies having any pain in her hands outside of the finger. She denies hitting her head or sustaining any other injuries during this fall.    Allergies:  No Known Drug Allergies     Medications:    Prozac  Levothyroxine    Past Medical History:    Depressive disorder  Hypocalcemia  Vitamin D deficiency  Hypothyroidism    Past Surgical History:    History reviewed. No pertinent past surgical history.     Social History:  Smoking Status: No  Smokeless Tobacco: No  Alcohol Use: Yes    Marital Status:  Single [1]    Review of Systems   Constitutional: Negative.    Musculoskeletal:        Positive for finger pain and defomity   Skin: Negative for color change and wound.   Neurological: Negative for syncope, numbness and headaches.     Physical Exam   Vitals:  Patient Vitals for the past 24 hrs:   BP Temp Temp src Heart Rate Resp SpO2   01/15/18 1115 117/84 - - - - -   01/15/18 1114 117/84 97.7  F (36.5  C) Temporal 82 16 99 %        Physical Exam  General: Adult female, uncomfortable appearing sitting upright in bed  MSK: Deformed right ring finger with angulation at the PIP joint. No palpable crepitus. No significant edema.  Skin: Warm and dry. No rashes or lesions or ecchymoses on visible skin. Capillary refill less than 2 seconds distal right ring finger.  Neuro: Alert and oriented. Responds appropriately to all questions and commands. No focal findings appreciated. Sensation in tact to light touch to right distal ring finger.  Psych: Normal mood and affect. Pleasant.     Emergency Department Course     Imaging:  Radiology findings were communicated with the  patient who voiced understanding of the findings.  Fingers XR, 2-3 views, right:  IMPRESSION:  Dislocation at the ring finger PIP joint with  ulnar/dorsal dislocation of the middle phalanx relative to the  proximal phalanx. Two tiny opacities in the soft tissues medial to the  head of the proximal phalanx are likely osseous fragments.  Reading per radiology.     Fingers XR, 2-3 views, right:  IMPRESSION: Interval reduction of the dislocation at the ring finger  PIP joint. Alignment anatomic. Small fracture fragment at the medial  joint space, likely off the head of the proximal phalanx.  Reading per radiology.       Procedures:     Digital Block     PROCEDURE:  Digital Block  LOCATION:  Left ring finger  ANESTHESIA: Digital block using 0.5% Bupivacaine, total of 4 mLs  PROCEDURE NOTE:  The patient tolerated the procedure well with good relief of  discomfort and there were no complications.    Reduction     SITE: Left ring finger     PROCEDURE PROVIDER: Alyson Lopez MD    CONSENT: Verbal consent obtained.    REDUCTION COMMENTS: The patient's ring finger was held in traction and countertraction with gentle pressure medially. The patient's finger then appeared reduced with improved alignment. Post reductions X-rays were obtained and showed good reduction.     PATIENT STATUS: Dislocation alignment improved post procedure and both sensation and circulation are intact. Post-procedure, the patient was feeling well.    Emergency Department Course:  Nursing notes and vitals reviewed.  I performed an exam of the patient as documented above.   The patient was sent for a XR while in the emergency department, results above.      I discussed the treatment plan with the patient. They expressed understanding of this plan and consented to discharge. They will be discharged home with instructions for care and follow up. In addition, the patient will return to the emergency department if their symptoms persist, worsen, if  new symptoms arise or if there is any concern.  All questions were answered.     I personally reviewed the laboratory results with the patient and answered all related questions prior to discharge.    Impression & Plan      Medical Decision Making:  Gracie Aranda is a 52 year old female, right hand dominant, who presents to the emergency department today with trauma to her right ring finger after fall directly on it. She had no other injuries. She was neurovascularly in tact from the standpoint of the finger which appeared forcefully dislocated on assessment. After x ray showed probably a tiny chip fracture and dislocation she was relocated as per procedure note. She was then splinted and jeannie taped appropriately. She is recommended follow up with [facility] for further evaluation and definitive treatment and care. I discussed this plan with her and she verbalized understanding and agreement. All questions answered prior to discharge.    Diagnosis:    ICD-10-CM    1. Dislocation of proximal interphalangeal joint of right ring finger, initial encounter S63.284A    2. Closed displaced fracture of proximal phalanx of right ring finger, initial encounter S62.614A         Disposition:   Discharged      Scribe Disclosure:  ELISA, Kelby Crabtree, am serving as a scribe at 11:15 AM on 1/15/2018 to document services personally performed by Alyson Lopez MD, based on my observations and the provider's statements to me.   [facility] EMERGENCY DEPARTMENT       Alyson Lopez MD  01/16/18 [id number]

## 2018-01-15 NOTE — ED AVS SNAPSHOT
[facility] Emergency Department    [address]    [facility] [address]    Phone:  [phone]    Fax:  [phone]                                       Gracie Aranda   MRN: [id number]    Department:  [facility] Emergency Department   Date of Visit:  1/15/2018           Patient Information     Date Of Birth          1965        Your diagnoses for this visit were:     Dislocation of proximal interphalangeal joint of right ring finger, initial encounter     Closed displaced fracture of proximal phalanx of right ring finger, initial encounter        You were seen by Alyson Lopez MD.      Follow-up Information     Follow up with [facility].    Why:  within 3-5 days        Follow up with [facility] Emergency Department.    Specialty:  EMERGENCY MEDICINE    Why:  As needed, If symptoms worsen    Contact information:    [address]  [facility] [address]  [phone]        Discharge Instructions         Finger Dislocation  A finger dislocation occurs when the tissues, or ligaments, that hold the joint together are torn. The bones then move apart, or are dislocated, out of their normal position. This causes pain, swelling, and bruising. Sometimes there is also a small chip fracture. Once the joint is put back into place again, it will take about 6 weeks for the ligaments to heal. During this time, you should protect your finger from re-injury.     Buddy taping is a way to secure your injured finger to the one next to it. Buddy taping allows the joint to move. But it also protects it from dislocating again. Buddy tape can be left in place for up to 6 weeks.  Hand exercises may be prescribed at your follow-up visit. These can help speed healing and maintain function. In most cases you will regain full function of your finger. But it may take 12 to 18 months before all mild pain and swelling goes away and  full function returns.  Home care    Keep your hand raised, or elevated, to reduce pain and swelling. When sitting or lying down, raise your arm above the level of your heart. You can do this by placing your arm on a pillow that rests on your chest. Or your arm can be on a pillow at your side. This is most important during the first 48 hours after injury.    Put an ice pack on the injured area for no more than 15 to 20 minutes every 3 to 6 hours. Do this for the first 24 to 48 hours. You can make an ice pack by wrapping a plastic Ziploc bag of ice cubes in a thin towel. As the ice melts, be careful that the tape, gauze, or splint doesn t get wet. After that, keep using ice as needed to ease pain and swelling.    If you have a removable splint, you may take it off to bathe and then put it back on. If you have a permanent splint, cover your entire hand with 2 plastic bags. Place 1 bag around the other. Tape each bag with duct tape at the top end. Water can still leak in even when your hand is covered. So it's best to keep the splint away from water. If a splint gets wet, you can dry it with a hair-dryer on a cool setting.     If you use buddy tape and it becomes wet or dirty, change it. You may replace it with paper, plastic, or cloth tape. Cloth tape and paper tapes must be kept dry. When re-applying buddy tape, use gauze or cotton padding between your fingers. This will prevent the skin from getting moist and breaking down, or macerating. It is very important to put padding at the web space. This is the small piece of skin that joins the bases of your fingers. Keep the buddy tape in place as instructed by your healthcare provider.    You may use over-the-counter pain medicine to control pain, unless another pain medicine was prescribed. Talk with your provider before taking these medicines if you have chronic liver or kidney disease. Also talk with your provider if you have ever had a stomach ulcer or GI  (gastrointestinal) bleeding.    Do not play sports or do any physical exercise until your healthcare provider says that you can.  Follow-up care  Follow up with your healthcare provider in 1 week, or as advised. Splints should generally not be left in place longer than 3 weeks to avoid stiffness and loss of joint function. It is important that you see the referral doctor. This provider can determine how long to keep your splint in place and when to begin hand exercises.  If X-rays were taken, you will be told of any new findings that may affect your care.  When to seek medical advice  Call your healthcare provider right away if any of the following occur:    The injured finger has more pain or swelling    The injured finger becomes red or warm    The injured finger becomes cold, blue, numb, or tingly  Date Last Reviewed: 11/23/2015 2000-2017 [employer]. [address] [place], [place] [address]. All rights reserved. This information is not intended as a substitute for professional medical care. Always follow your healthcare professional's instructions.          Finger Fracture, Closed  You have a broken finger (fracture). This causes local pain, swelling, and bruising. This injury usually takes about 4 to 6 weeks to heal, but can take longer in some cases. Finger injuries are often treated with a splint or cast, or by taping the injured finger to the next one (jeannie taping). This protects the injured finger and holds the bone in position while it heals. More serious fractures may need surgery.     If the fingernail has been severely injured, it will probably fall off in 1 to 2 weeks. A new fingernail will usually start to grow back within a month.  Home care  Follow these guidelines when caring for yourself at home:    Keep your hand elevated to reduce pain and swelling. When sitting or lying down keep your arm above the level of your heart. You can do this by placing your arm on a pillow that  rests on your chest or on a pillow at your side. This is most important during the first 2 days (48 hours) after the injury.    Put an ice pack on the injured area. Do this for 20 minutes every 1 to 2 hours the first day for pain relief. You can make an ice pack by wrapping a plastic bag of ice cubes in a thin towel. As the ice melts, be careful that the cast or splint doesn t get wet. Continue using the ice pack 3 to 4 times a day until the pain and swelling go away.    Keep the cast or splint completely dry at all times. Bathe with your cast or splint out of the water. Protect it with a large plastic bag, rubber-banded at the top end. If a fiberglass cast or splint gets wet, you can dry it with a hair dryer.    If jeannie tape was put on and it becomes wet or dirty, change it. You may replace it with paper, plastic, or cloth tape. Cloth tape and paper tapes must be kept dry. Keep the jeannie tape in place for at least 4 weeks.    You may use acetaminophen or ibuprofen to control pain, unless another pain medicine was prescribed. If you have chronic liver or kidney disease, talk with your healthcare provider before using these medicines. Also talk with your provider if you ve had a stomach ulcer or gastrointestinal bleeding.    Don t put creams or objects under the cast if you have itching.  Follow-up care  Follow up with your healthcare provider, or as advised. This is to make sure the bone is healing the way it should.  X-rays may be taken. You will be told of any new findings that may affect your care.  When to seek medical advice  Call your healthcare provider right away if any of these occur:    The plaster cast or splint becomes wet or soft    The cast or splint cracks    The fiberglass cast or splint stays wet for more than 24 hours    Pain or swelling gets worse    Redness, warmth, swelling, drainage from the wound, or foul odor from a cast or splint    Finger becomes more cold, blue, numb, or tingly    You  can t move your finger    The skin around the cast or splint becomes red    Fever of 100.4 F (38 C) or higher, or as directed by your healthcare provider  Date Last Reviewed: 2/1/2017 2000-2017 [employer]. [address] [place] [address], [place] [address]. All rights reserved. This information is not intended as a substitute for professional medical care. Always follow your healthcare professional's instructions.          Future Appointments        Provider Department Dept Phone Center    4/24/2018 10:00 AM Susan Haase, APRN [facility] [phone] [facility]      24 Hour Appointment Hotline       To make an appointment at any [facility], call [phone] ([phone]). If you don't have a family doctor or clinic, we will help you find one. [facility] are conveniently located to serve the needs of you and your family.             Review of your medicines      Our records show that you are taking the medicines listed below. If these are incorrect, please call your family doctor or clinic.        Dose / Directions Last dose taken    FLUoxetine 20 MG capsule   Commonly known as:  PROzac   Dose:  20 mg   Quantity:  90 capsule        Take 1 capsule (20 mg) by mouth daily   Refills:  1        levothyroxine 50 MCG tablet   Commonly known as:  SYNTHROID/LEVOTHROID   Dose:  50 mcg   Quantity:  90 tablet        Take 1 tablet (50 mcg) by mouth daily   Refills:  3        Multi-vitamin Tabs tablet   Dose:  1 tablet        Take 1 tablet by mouth daily   Refills:  0                Procedures and tests performed during your visit     Procedure/Test Number of Times Performed    Fingers XR, 2-3 views, right 2      Orders Needing Specimen Collection     None      Pending Results     No orders found from 1/13/2018 to 1/16/2018.            Pending Culture Results     No orders found from 1/13/2018 to 1/16/2018.            Pending Results Instructions     If you had any lab results that were not  finalized at the time of your Discharge, you can call the ED Lab Result RN at [phone]. You will be contacted by this team for any positive Lab results or changes in treatment. The nurses are available 7 days a week from 10A to 6:30P.  You can leave a message 24 hours per day and they will return your call.        Test Results From Your Hospital Stay        1/15/2018 11:49 AM      Narrative     RIGHT FINGER TWO OR MORE VIEWS  1/15/2018 11:39 AM    HISTORY:  Trauma, pain.    COMPARISON:  None.        Impression     IMPRESSION:  Dislocation at the ring finger PIP joint with  ulnar/dorsal dislocation of the middle phalanx relative to the  proximal phalanx. Two tiny opacities in the soft tissues medial to the  head of the proximal phalanx are likely osseous fragments.    FOREIGN GLASS MD         1/15/2018 12:45 PM      Narrative     FINGER RIGHT TWO OR MORE VIEWS January 15, 2018 12:01 PM    HISTORY: Postreduction.     COMPARISON: Earlier today.        Impression     IMPRESSION: Interval reduction of the dislocation at the ring finger  PIP joint. Alignment anatomic. Small fracture fragment at the medial  joint space, likely off the head of the proximal phalanx.    FOREIGN GLASS MD                Clinical Quality Measure: Blood Pressure Screening     Your blood pressure was checked while you were in the emergency department today. The last reading we obtained was  BP: 117/84 . Please read the guidelines below about what these numbers mean and what you should do about them.  If your systolic blood pressure (the top number) is less than 120 and your diastolic blood pressure (the bottom number) is less than 80, then your blood pressure is normal. There is nothing more that you need to do about it.  If your systolic blood pressure (the top number) is 120-139 or your diastolic blood pressure (the bottom number) is 80-89, your blood pressure may be higher than it should be. You should have your blood pressure rechecked  within a year by a primary care provider.  If your systolic blood pressure (the top number) is 140 or greater or your diastolic blood pressure (the bottom number) is 90 or greater, you may have high blood pressure. High blood pressure is treatable, but if left untreated over time it can put you at risk for heart attack, stroke, or kidney failure. You should have your blood pressure rechecked by a primary care provider within the next 4 weeks.  If your provider in the emergency department today gave you specific instructions to follow-up with your doctor or provider even sooner than that, you should follow that instruction and not wait for up to 4 weeks for your follow-up visit.        Thank you for choosing [facility]       Thank you for choosing [facility] for your care. Our goal is always to provide you with excellent care. Hearing back from our patients is one way we can continue to improve our services. Please take a few minutes to complete the written survey that you may receive in the mail after you visit with us. Thank you!        [employer] Information     [employer] gives you secure access to your electronic health record. If you see a primary care provider, you can also send messages to your care team and make appointments. If you have questions, please call your primary care clinic.  If you do not have a primary care provider, please call [phone] and they will assist you.        Care EveryWhere ID     This is your Care EveryWhere ID. This could be used by other organizations to access your [facility] medical records  [id number]        Equal Access to Services     LILIA CONRAD : Hadii rox Swanson, jonathon wray, qaybariel vernon. So [facility] [phone].    ATENCIÓN: Si habla español, tiene a duncan disposición servicios gratuitos de asistencia lingüística. Jeri al [phone].    We comply with applicable federal civil rights laws and [place] laws.  We do not discriminate on the basis of race, color, national origin, age, disability, sex, sexual orientation, or gender identity.            After Visit Summary       This is your record. Keep this with you and show to your community pharmacist(s) and doctor(s) at your next visit.

## 2018-01-15 NOTE — ED NOTES
Patient presents with hand injury, specifically right ring finger injury after breaking a fall on her buttocks. Patient states 10/10 pain. No other injuries noted. ABCDs intact. Alert and oriented x 4.

## 2018-01-15 NOTE — ED AVS SNAPSHOT
[facility] Emergency Department    [address]    [facility] [address]    Phone:  [phone]    Fax:  [phone]                                       Gracie Aranda   MRN: [id number]    Department:  [facility] Emergency Department   Date of Visit:  1/15/2018           After Visit Summary Signature Page     I have received my discharge instructions, and my questions have been answered. I have discussed any challenges I see with this plan with the nurse or doctor.    ..........................................................................................................................................  Patient/Patient Representative Signature      ..........................................................................................................................................  Patient Representative Print Name and Relationship to Patient    ..................................................               ................................................  Date                                            Time    ..........................................................................................................................................  Reviewed by Signature/Title    ...................................................              ..............................................  Date                                                            Time

## 2018-01-16 ASSESSMENT — ENCOUNTER SYMPTOMS
CONSTITUTIONAL NEGATIVE: 1
WOUND: 0
COLOR CHANGE: 0

## 2018-02-25 ASSESSMENT — PATIENT HEALTH QUESTIONNAIRE - PHQ9
10. IF YOU CHECKED OFF ANY PROBLEMS, HOW DIFFICULT HAVE THESE PROBLEMS MADE IT FOR YOU TO DO YOUR WORK, TAKE CARE OF THINGS AT HOME, OR GET ALONG WITH OTHER PEOPLE: SOMEWHAT DIFFICULT
SUM OF ALL RESPONSES TO PHQ QUESTIONS 1-9: 7
SUM OF ALL RESPONSES TO PHQ QUESTIONS 1-9: 7

## 2018-02-25 ASSESSMENT — ANXIETY QUESTIONNAIRES
6. BECOMING EASILY ANNOYED OR IRRITABLE: SEVERAL DAYS
4. TROUBLE RELAXING: NOT AT ALL
7. FEELING AFRAID AS IF SOMETHING AWFUL MIGHT HAPPEN: NOT AT ALL
2. NOT BEING ABLE TO STOP OR CONTROL WORRYING: SEVERAL DAYS
5. BEING SO RESTLESS THAT IT IS HARD TO SIT STILL: NOT AT ALL
GAD7 TOTAL SCORE: 3
7. FEELING AFRAID AS IF SOMETHING AWFUL MIGHT HAPPEN: NOT AT ALL
1. FEELING NERVOUS, ANXIOUS, OR ON EDGE: NOT AT ALL
3. WORRYING TOO MUCH ABOUT DIFFERENT THINGS: SEVERAL DAYS
GAD7 TOTAL SCORE: 3

## 2018-02-26 ENCOUNTER — OFFICE VISIT (OUTPATIENT)
Dept: FAMILY MEDICINE | Facility: CLINIC | Age: 53
End: 2018-02-26
Payer: COMMERCIAL

## 2018-02-26 VITALS
TEMPERATURE: 97.5 F | HEIGHT: 62 IN | WEIGHT: 160 LBS | DIASTOLIC BLOOD PRESSURE: 60 MMHG | BODY MASS INDEX: 29.44 KG/M2 | OXYGEN SATURATION: 98 % | RESPIRATION RATE: 14 BRPM | SYSTOLIC BLOOD PRESSURE: 104 MMHG | HEART RATE: 78 BPM

## 2018-02-26 DIAGNOSIS — F33.0 MAJOR DEPRESSIVE DISORDER, RECURRENT EPISODE, MILD (H): Primary | ICD-10-CM

## 2018-02-26 PROCEDURE — 99213 OFFICE O/P EST LOW 20 MIN: CPT | Performed by: NURSE PRACTITIONER

## 2018-02-26 RX ORDER — FLUOXETINE 40 MG/1
40 CAPSULE ORAL DAILY
Qty: 90 CAPSULE | Refills: 3 | Status: SHIPPED | OUTPATIENT
Start: 2018-02-26 | End: 2019-02-26

## 2018-02-26 ASSESSMENT — ANXIETY QUESTIONNAIRES: GAD7 TOTAL SCORE: 3

## 2018-02-26 ASSESSMENT — PATIENT HEALTH QUESTIONNAIRE - PHQ9: SUM OF ALL RESPONSES TO PHQ QUESTIONS 1-9: 7

## 2018-02-26 NOTE — PROGRESS NOTES
SUBJECTIVE:   Gracie Aranda is a 52 year old female who presents to clinic today for the following health issues:      History of Present Illness     Depression & Anxiety Follow-up:     Depression/Anxiety:  Depression only    Status since last visit::  Worsened    Other associated symptoms of depression::  None    Significant life event::  No    Current substance use::  None    Anxiety/Panic symptoms::  No       Today's PHQ-9         PHQ-9 Total Score:     (P) 7   PHQ-9 Q9 Suicidal ideation:   (P) Not at all   Thoughts of suicide or self harm:      Self-harm Plan:        Self-harm Action:          Safety concerns for self or others:       SUSANNA-7 Total Score: (P) 3    Answers for HPI/ROS submitted by the patient on 2/25/2018   SUSANNA 7 TOTAL SCORE: 3  If you checked off any problems, how difficult have these problems made it for you to do your work, take care of things at home, or get along with other people?: Somewhat difficult  PHQ9 TOTAL SCORE: 7    Continues to take fluoxetine 20 mg daily. Symptoms are well controlled, besides occasionally increased emotions. She is unsure if it's related to her period or depression.     Perimenopause: Periods are irregular, varying roughly every 4 months. She does not get warning signs prior to bleeding. Associated symptoms before bleeding are migraine, body aches, increased emotions and cramping during menstruation. LMP 02/23, bleeding is heavy, filling a regular tampon in 2 hours. Periods are lasting roughly 1.5 days. Last period November 2017. Denies hot flashes.     Problem list and histories reviewed & adjusted, as indicated.  Additional history: as documented    Patient Active Problem List   Diagnosis     Hypothyroidism     Vitamin D deficiency     CARDIOVASCULAR SCREENING; LDL GOAL LESS THAN 160     Hypocalcemia     Menopausal flushing     Major depressive disorder, recurrent episode, mild (H)     Past Surgical History:   Procedure Laterality Date     NO HISTORY OF  "SURGERY         Social History   Substance Use Topics     Smoking status: Never Smoker     Smokeless tobacco: Never Used     Alcohol use Yes      Comment: rare     Family History   Problem Relation Age of Onset     CANCER Mother      brain tumor     Depression Sister      DIABETES Maternal Aunt      DIABETES Maternal Aunt          Current Outpatient Prescriptions   Medication Sig Dispense Refill     FLUoxetine (PROZAC) 20 MG capsule Take 1 capsule (20 mg) by mouth daily 90 capsule 1     levothyroxine (SYNTHROID/LEVOTHROID) 50 MCG tablet Take 1 tablet (50 mcg) by mouth daily 90 tablet 3     multivitamin, therapeutic with minerals (MULTI-VITAMIN) TABS Take 1 tablet by mouth daily       Allergies   Allergen Reactions     No Known Drug Allergies        ROS:  Constitutional,  cardiovascular, pulmonary, , endocrine and psych systems are negative, except as otherwise noted.    This document serves as a record of the services and decisions personally performed and made by Susan Haase, CNP. It was created on her behalf by Mariel Millan, a trained medical scribe. The creation of this document is based on the provider's statements to the medical scribe.  Mariel Millan 11:21 AM February 26, 2018  OBJECTIVE:   /60 (BP Location: Right arm, Patient Position: Chair, Cuff Size: Adult Regular)  Pulse 78  Temp 97.5  F (36.4  C) (Oral)  Resp 14  Ht 1.575 m (5' 2\")  Wt 72.6 kg (160 lb)  SpO2 98%  BMI 29.26 kg/m2  Body mass index is 29.26 kg/(m^2).  GENERAL: healthy, alert and no distress  PSYCH: mentation appears normal, affect normal/bright  ASSESSMENT/PLAN:   Gracie was seen today for depression and health maintenance.    Diagnoses and all orders for this visit:    Major depressive disorder, recurrent episode, mild (H):  PHQ 9 score of 7. Due to increased emotions prior to menses will increase Prozac to 40 mg every day.    -     FLUoxetine (PROZAC) 40 MG capsule; Take 1 capsule (40 mg) by mouth daily    Other orders  -     " DEPRESSION ACTION PLAN (DAP)      Follow up in 2-4 weeks for fasting physical, sooner as needed.     The information in this document, created by the medical scribe for me, accurately reflects the services I personally performed and the decisions made by me. I have reviewed and approved this document for accuracy prior to leaving the patient care area.  February 26, 2018 11:23 AM  Susan Haase, APRN [facility]

## 2018-02-26 NOTE — MR AVS SNAPSHOT
After Visit Summary   2/26/2018    Gracie Aranda    MRN: [id number]           Patient Information     Date Of Birth          1965        Visit Information        Provider Department      2/26/2018 11:00 AM Haase, Susan Rachele, APRN CNP [facility]        Today's Diagnoses     Major depressive disorder, recurrent episode, mild (H)    -  1       Follow-ups after your visit        Follow-up notes from your care team     Return in about 4 weeks (around 3/26/2018) for Physical Exam, Lab Work.      Your next 10 appointments already scheduled     Apr 24, 2018 10:00 AM CDT   SHORT with Susan Rachele Haase, APRN CNP   [facility]    [address] [place] [address] [phone]              Who to contact     If you have questions or need follow up information about today's clinic visit or your schedule please contact [facility] directly at [phone].  Normal or non-critical lab and imaging results will be communicated to you by SABIAhart, letter or phone within 4 business days after the clinic has received the results. If you do not hear from us within 7 days, please contact the clinic through MyWebGrocert or phone. If you have a critical or abnormal lab result, we will notify you by phone as soon as possible.  Submit refill requests through ADCentricity or call your pharmacy and they will forward the refill request to us. Please allow 3 business days for your refill to be completed.          Additional Information About Your Visit        SABIAhart Information     ADCentricity gives you secure access to your electronic health record. If you see a primary care provider, you can also send messages to your care team and make appointments. If you have questions, please call your primary care clinic.  If you do not have a primary care provider, please call [phone] and they will assist you.        Care  "EveryWhere ID     This is your Care EveryWhere ID. This could be used by other organizations to access your [facility] medical records  [id number]        Your Vitals Were     Pulse Temperature Respirations Height Pulse Oximetry BMI (Body Mass Index)    78 97.5  F (36.4  C) (Oral) 14 5' 2\" (1.575 m) 98% 29.26 kg/m2       Blood Pressure from Last 3 Encounters:   02/26/18 104/60   01/15/18 117/84   10/31/17 108/74    Weight from Last 3 Encounters:   02/26/18 160 lb (72.6 kg)   10/31/17 155 lb (70.3 kg)   03/10/17 152 lb (68.9 kg)              We Performed the Following     DEPRESSION ACTION PLAN (DAP)          Today's Medication Changes          These changes are accurate as of 2/26/18 11:23 AM.  If you have any questions, ask your nurse or doctor.               These medicines have changed or have updated prescriptions.        Dose/Directions    * FLUoxetine 20 MG capsule   Commonly known as:  PROzac   This may have changed:  Another medication with the same name was added. Make sure you understand how and when to take each.   Used for:  Major depressive disorder, recurrent episode, mild (H)   Changed by:  Haase, Susan Rachele, APRN CNP        Dose:  20 mg   Take 1 capsule (20 mg) by mouth daily   Quantity:  90 capsule   Refills:  1       * FLUoxetine 40 MG capsule   Commonly known as:  PROzac   This may have changed:  You were already taking a medication with the same name, and this prescription was added. Make sure you understand how and when to take each.   Used for:  Major depressive disorder, recurrent episode, mild (H)   Changed by:  Haase, Susan Rachele, APRN CNP        Dose:  40 mg   Take 1 capsule (40 mg) by mouth daily   Quantity:  90 capsule   Refills:  3       * Notice:  This list has 2 medication(s) that are the same as other medications prescribed for you. Read the directions carefully, and ask your doctor or other care provider to review them with you.         Where to get your medicines      These " medications were sent to [facility] [address]  [address] [facility] [address]     Phone:  [phone]     FLUoxetine 40 MG capsule                Primary Care Provider Office Phone # Fax #    Susan Rachele Haase, APRN -[phone]       [address]  [facility] [address]        Equal Access to Services     LILIA CONRAD : Hadii aad ku hadasho Soomaali, waaxda luqadaha, qaybta kaalmada adeegyada, waxay idiin hayaan adeeg kharash laabad . So [facility] [phone].    ATENCIÓN: Si dorala magdiel, tiene a duncan disposición servicios gratuitos de asistencia lingüística. Jeri al [phone].    We comply with applicable federal civil rights laws and [place] laws. We do not discriminate on the basis of race, color, national origin, age, disability, sex, sexual orientation, or gender identity.            Thank you!     Thank you for choosing [facility]  for your care. Our goal is always to provide you with excellent care. Hearing back from our patients is one way we can continue to improve our services. Please take a few minutes to complete the written survey that you may receive in the mail after your visit with us. Thank you!             Your Updated Medication List - Protect others around you: Learn how to safely use, store and throw away your medicines at www.disposemymeds.org.          This list is accurate as of 2/26/18 11:23 AM.  Always use your most recent med list.                   Brand Name Dispense Instructions for use Diagnosis    * FLUoxetine 20 MG capsule    PROzac    90 capsule    Take 1 capsule (20 mg) by mouth daily    Major depressive disorder, recurrent episode, mild (H)       * FLUoxetine 40 MG capsule    PROzac    90 capsule    Take 1 capsule (40 mg) by mouth daily    Major depressive disorder, recurrent episode, mild (H)       levothyroxine 50 MCG tablet    SYNTHROID/LEVOTHROID    90 tablet    Take 1 tablet (50  mcg) by mouth daily    Hypothyroidism, unspecified type       Multi-vitamin Tabs tablet      Take 1 tablet by mouth daily        * Notice:  This list has 2 medication(s) that are the same as other medications prescribed for you. Read the directions carefully, and ask your doctor or other care provider to review them with you.

## 2018-03-17 ENCOUNTER — HEALTH MAINTENANCE LETTER (OUTPATIENT)
Age: 53
End: 2018-03-17

## 2018-03-28 ENCOUNTER — OFFICE VISIT (OUTPATIENT)
Dept: FAMILY MEDICINE | Facility: CLINIC | Age: 53
End: 2018-03-28
Payer: COMMERCIAL

## 2018-03-28 VITALS
HEART RATE: 68 BPM | WEIGHT: 154 LBS | HEIGHT: 62 IN | SYSTOLIC BLOOD PRESSURE: 100 MMHG | RESPIRATION RATE: 10 BRPM | DIASTOLIC BLOOD PRESSURE: 60 MMHG | TEMPERATURE: 98 F | BODY MASS INDEX: 28.34 KG/M2

## 2018-03-28 DIAGNOSIS — Z23 NEED FOR TDAP VACCINATION: ICD-10-CM

## 2018-03-28 DIAGNOSIS — F33.0 MAJOR DEPRESSIVE DISORDER, RECURRENT EPISODE, MILD (H): ICD-10-CM

## 2018-03-28 DIAGNOSIS — Z12.11 COLON CANCER SCREENING: ICD-10-CM

## 2018-03-28 DIAGNOSIS — Z12.4 CERVICAL CANCER SCREENING: Primary | ICD-10-CM

## 2018-03-28 DIAGNOSIS — E03.9 HYPOTHYROIDISM, UNSPECIFIED TYPE: ICD-10-CM

## 2018-03-28 PROCEDURE — G0145 SCR C/V CYTO,THINLAYER,RESCR: HCPCS | Performed by: NURSE PRACTITIONER

## 2018-03-28 PROCEDURE — 99213 OFFICE O/P EST LOW 20 MIN: CPT | Mod: 25 | Performed by: NURSE PRACTITIONER

## 2018-03-28 PROCEDURE — 90471 IMMUNIZATION ADMIN: CPT | Performed by: NURSE PRACTITIONER

## 2018-03-28 PROCEDURE — 90715 TDAP VACCINE 7 YRS/> IM: CPT | Performed by: NURSE PRACTITIONER

## 2018-03-28 RX ORDER — LEVOTHYROXINE SODIUM 50 UG/1
50 TABLET ORAL DAILY
Qty: 90 TABLET | Refills: 3 | Status: SHIPPED | OUTPATIENT
Start: 2018-03-28 | End: 2019-02-26

## 2018-03-28 NOTE — PROGRESS NOTES
SUBJECTIVE:   Gracie Aranda is a 52 year old female who presents to clinic today for the following health issues:    Gyn exam: pap only    Mood stable over past month. Periods are irregular, varying roughly every 4 months. She does not get warning signs prior to bleeding. Associated symptoms before bleeding are migraine, body aches, increased emotions and cramping during menstruation. LMP 02/23, bleeding was heavy, filling a regular tampon in 2 hours. Periods lasted roughly 1.5 days. Last period November 2017. Denies hot flashes.     Cervical cancer screening: Pap performed today. Last pap 2015, results NIL.     Colon cancer screening: due for screening.      Depression:  symptoms well controlled on fluoxetine 40 mg every day.   Problem list and histories reviewed & adjusted, as indicated.  Additional history: as documented    Patient Active Problem List   Diagnosis     Hypothyroidism     Vitamin D deficiency     CARDIOVASCULAR SCREENING; LDL GOAL LESS THAN 160     Hypocalcemia     Menopausal flushing     Major depressive disorder, recurrent episode, mild (H)     Past Surgical History:   Procedure Laterality Date     NO HISTORY OF SURGERY         Social History   Substance Use Topics     Smoking status: Never Smoker     Smokeless tobacco: Never Used     Alcohol use Yes      Comment: rare     Family History   Problem Relation Age of Onset     CANCER Mother      brain tumor     Depression Sister      DIABETES Maternal Aunt      DIABETES Maternal Aunt          Current Outpatient Prescriptions   Medication Sig Dispense Refill     FLUoxetine (PROZAC) 40 MG capsule Take 1 capsule (40 mg) by mouth daily 90 capsule 3     levothyroxine (SYNTHROID/LEVOTHROID) 50 MCG tablet Take 1 tablet (50 mcg) by mouth daily 90 tablet 3     multivitamin, therapeutic with minerals (MULTI-VITAMIN) TABS Take 1 tablet by mouth daily       Allergies   Allergen Reactions     No Known Drug Allergies        Reviewed and updated as needed this  "visit by clinical staff       Reviewed and updated as needed this visit by Provider         ROS:  Constitutional, HEENT, cardiovascular, pulmonary, GI, , musculoskeletal, neuro, skin, endocrine and psych systems are negative, except as otherwise noted.    This document serves as a record of the services and decisions personally performed and made by Susan Haase, CNP. It was created on her behalf by Mariel Millan, a trained medical scribe. The creation of this document is based on the provider's statements to the medical scribe.  Mariel Millan 9:22 AM March 28, 2018  OBJECTIVE:   /60 (BP Location: Right arm, Patient Position: Chair, Cuff Size: Adult Regular)  Pulse 68  Temp 98  F (36.7  C) (Oral)  Resp 10  Ht 1.575 m (5' 2\")  Wt 69.9 kg (154 lb)  BMI 28.17 kg/m2  Body mass index is 28.17 kg/(m^2).  GENERAL: healthy, alert and no distress   (female): normal female external genitalia, normal urethral meatus, vaginal mucosa, normal cervix/adnexa/uterus without masses or discharge  PSYCH: mentation appears normal, affect normal/bright  ASSESSMENT/PLAN:   Gracie was seen today for gyn exam.    Diagnoses and all orders for this visit:    Cervical cancer screening  -     PAP imaged thin layer screen    Colon cancer screening  -     Fecal colorectal cancer screen (FIT); Future    Major depressive disorder, recurrent episode, mild (H):well controlled, continue on fluoxetine.    Hypothyroidism, unspecified type: last TSH in 10/2017 was normal at 2.14  -     levothyroxine (SYNTHROID/LEVOTHROID) 50 MCG tablet; Take 1 tablet (50 mcg) by mouth daily    Need for Tdap vaccination  -     TDAP VACCINE (ADACEL)  -     ADMIN 1st VACCINE          FUTURE APPOINTMENTS:       - Follow-up visit in 6 months, sooner as needed.     The information in this document, created by the medical scribe for me, accurately reflects the services I personally performed and the decisions made by me. I have reviewed and approved this document for " accuracy prior to leaving the patient care area.  March 28, 2018 9:27 AM  Susan Haase, APRN [facility]

## 2018-03-28 NOTE — MR AVS SNAPSHOT
After Visit Summary   3/28/2018    Gracie Aranda    MRN: [id number]           Patient Information     Date Of Birth          1965        Visit Information        Provider Department      3/28/2018 9:15 AM Haase, Susan Rachele, APRN CNP [facility]        Today's Diagnoses     Colon cancer screening    -  1    Need for Tdap vaccination        Cervical cancer screening           Follow-ups after your visit        Follow-up notes from your care team     Return in about 6 months (around 9/28/2018).      Your next 10 appointments already scheduled     Apr 24, 2018 10:00 AM CDT   SHORT with Susan Rachele Haase, APRN CNP   [facility] ([facility]    [address] [place] [address] [phone]              Future tests that were ordered for you today     Open Future Orders        Priority Expected Expires Ordered    Fecal colorectal cancer screen (FIT) Routine 4/18/2018 6/20/2018 3/28/2018            Who to contact     If you have questions or need follow up information about today's clinic visit or your schedule please contact [facility] directly at [phone].  Normal or non-critical lab and imaging results will be communicated to you by MyChart, letter or phone within 4 business days after the clinic has received the results. If you do not hear from us within 7 days, please contact the clinic through Backlifthart or phone. If you have a critical or abnormal lab result, we will notify you by phone as soon as possible.  Submit refill requests through [employer] or call your pharmacy and they will forward the refill request to us. Please allow 3 business days for your refill to be completed.          Additional Information About Your Visit        MyChart Information     [employer] gives you secure access to your electronic health record. If you see a primary care provider, you can also send messages to your care  "team and make appointments. If you have questions, please call your primary care clinic.  If you do not have a primary care provider, please call [phone] and they will assist you.        Care EveryWhere ID     This is your Care EveryWhere ID. This could be used by other organizations to access your [facility] medical records  [id number]        Your Vitals Were     Pulse Temperature Respirations Height BMI (Body Mass Index)       68 98  F (36.7  C) (Oral) 10 5' 2\" (1.575 m) 28.17 kg/m2        Blood Pressure from Last 3 Encounters:   03/28/18 100/60   02/26/18 104/60   01/15/18 117/84    Weight from Last 3 Encounters:   03/28/18 154 lb (69.9 kg)   02/26/18 160 lb (72.6 kg)   10/31/17 155 lb (70.3 kg)              We Performed the Following     ADMIN 1st VACCINE     PAP imaged thin layer screen     TDAP VACCINE (ADACEL)        Primary Care Provider Office Phone # Fax #    Gemini Rachele Haase, APRN -[phone] [address] [facility] [address]        Equal Access to Services     [facility] AH: Hadii aad ku hadasho Soomaali, waaxda luqadaha, qaybta kaalmada adeegyada, waxay idiin hayaan adeeg paarasalbador la'stellan . So [facility] [phone].    ATENCIÓN: Si habla español, tiene a duncan disposición servicios gratuitos de asistencia lingüística. Llame al [phone].    We comply with applicable federal civil rights laws and [place] laws. We do not discriminate on the basis of race, color, national origin, age, disability, sex, sexual orientation, or gender identity.            Thank you!     Thank you for choosing [facility]  for your care. Our goal is always to provide you with excellent care. Hearing back from our patients is one way we can continue to improve our services. Please take a few minutes to complete the written survey that you may receive in the mail after your visit with us. Thank you!             Your Updated Medication List - Protect others around you: Learn how " to safely use, store and throw away your medicines at www.disposemymeds.org.          This list is accurate as of 3/28/18  9:33 AM.  Always use your most recent med list.                   Brand Name Dispense Instructions for use Diagnosis    FLUoxetine 40 MG capsule    PROzac    90 capsule    Take 1 capsule (40 mg) by mouth daily    Major depressive disorder, recurrent episode, mild (H)       levothyroxine 50 MCG tablet    SYNTHROID/LEVOTHROID    90 tablet    Take 1 tablet (50 mcg) by mouth daily    Hypothyroidism, unspecified type       Multi-vitamin Tabs tablet      Take 1 tablet by mouth daily

## 2018-03-30 LAB
COPATH REPORT: NORMAL
PAP: NORMAL

## 2018-05-03 DIAGNOSIS — F33.0 MAJOR DEPRESSIVE DISORDER, RECURRENT EPISODE, MILD (H): ICD-10-CM

## 2018-05-03 NOTE — TELEPHONE ENCOUNTER
Has refills, resent  PHQ-9 score:    PHQ-9 SCORE 2/25/2018   Total Score -   Total Score MyChart 7 (Mild depression)   Total Score 7               Prescription approved per [facility] Refill Protocol.  Gaby Dey RN, BSN

## 2018-05-14 ENCOUNTER — TELEPHONE (OUTPATIENT)
Dept: FAMILY MEDICINE | Facility: CLINIC | Age: 53
End: 2018-05-14

## 2018-05-14 NOTE — LETTER
[address] [address] [phone]  May 21, 2018    Gracie Aranda  [address] [place] [id number]    Dear Gracie,    I care about your health and have reviewed your health plan. I have reviewed your medical conditions, medication list, and lab results and am making recommendations based on this review, to better manage your health.    You are in particular need of attention regarding:  -Colon Cancer Screening    I am recommending that you:  -schedule a COLONOSCOPY to look for colon cancer (due every 10 years or 5 years in higher risk situations.)   Colon cancer is now the second leading cause of death in the [place] for both men and women and there are over 130,000 new cases and 50,000 deaths per year from colon cancer.  Colonoscopies can prevent 90-95% of these deaths.  Problem lesions can be removed before they ever become cancer.  This test is not only looking for cancer, but also getting rid of precancerious lesions.  If you do not wish to do a colonoscopy or cannot afford to do one, at this time, there is another option. It is called a FIT test or Fecal Immunochemical Occult Blood Test (take home stool sample kit).  It does not replace the colonoscopy for colorectal cancer screening, but it can detect hidden bleeding in the lower colon.  It does need to be repeated every year and if a positive result is obtained, you would be referred for a colonoscopy.  If you have completed either one of these tests at another facility, please have the records sent to our clinic so that we can best coordinate your care.    Here is a list of Health Maintenance topics that are due now or due soon:  Health Maintenance Due   Topic Date Due     FIT Q1 YR  04/19/2018       Please call us at [phone] (or use Apps4All) to address the above recommendations.     Thank you for trusting [facility] and we appreciate the  opportunity to serve you.  We look forward to supporting your healthcare needs in the future.    Healthy Regards,    Susan Haase CNP

## 2018-05-14 NOTE — TELEPHONE ENCOUNTER
Panel Management Review      Patient has the following on her problem list: None      Composite cancer screening  Chart review shows that this patient is due/due soon for the following Fecal Colorectal (FIT)  Summary:    Patient is due/failing the following:   FIT    Action needed:   Patient needs referral/order: FIT test    Type of outreach:    Sent MomentFeed message.    Questions for provider review:    None                                                                                                                                    Linda Stevens, [facility]       Chart routed to Care Team .

## 2018-05-24 PROCEDURE — 82274 ASSAY TEST FOR BLOOD FECAL: CPT | Performed by: NURSE PRACTITIONER

## 2018-05-26 LAB — HEMOCCULT STL QL IA: NEGATIVE

## 2018-05-29 DIAGNOSIS — Z12.11 COLON CANCER SCREENING: ICD-10-CM

## 2019-02-26 ENCOUNTER — OFFICE VISIT (OUTPATIENT)
Dept: FAMILY MEDICINE | Facility: CLINIC | Age: 54
End: 2019-02-26
Payer: COMMERCIAL

## 2019-02-26 VITALS
TEMPERATURE: 97 F | OXYGEN SATURATION: 97 % | BODY MASS INDEX: 31.1 KG/M2 | HEIGHT: 62 IN | RESPIRATION RATE: 14 BRPM | WEIGHT: 169 LBS | HEART RATE: 64 BPM | SYSTOLIC BLOOD PRESSURE: 102 MMHG | DIASTOLIC BLOOD PRESSURE: 60 MMHG

## 2019-02-26 DIAGNOSIS — E03.9 HYPOTHYROIDISM, UNSPECIFIED TYPE: ICD-10-CM

## 2019-02-26 DIAGNOSIS — F33.0 MAJOR DEPRESSIVE DISORDER, RECURRENT EPISODE, MILD (H): Primary | ICD-10-CM

## 2019-02-26 DIAGNOSIS — Z13.6 CARDIOVASCULAR SCREENING; LDL GOAL LESS THAN 160: ICD-10-CM

## 2019-02-26 DIAGNOSIS — E55.9 VITAMIN D DEFICIENCY: ICD-10-CM

## 2019-02-26 DIAGNOSIS — R51.9 ACUTE NONINTRACTABLE HEADACHE, UNSPECIFIED HEADACHE TYPE: ICD-10-CM

## 2019-02-26 LAB
BASOPHILS # BLD AUTO: 0 10E9/L (ref 0–0.2)
BASOPHILS NFR BLD AUTO: 0.5 %
DIFFERENTIAL METHOD BLD: NORMAL
EOSINOPHIL # BLD AUTO: 0.2 10E9/L (ref 0–0.7)
EOSINOPHIL NFR BLD AUTO: 2.8 %
ERYTHROCYTE [DISTWIDTH] IN BLOOD BY AUTOMATED COUNT: 13.2 % (ref 10–15)
ERYTHROCYTE [SEDIMENTATION RATE] IN BLOOD BY WESTERGREN METHOD: 9 MM/H (ref 0–30)
HCT VFR BLD AUTO: 41.3 % (ref 35–47)
HGB BLD-MCNC: 14 G/DL (ref 11.7–15.7)
LYMPHOCYTES # BLD AUTO: 1.4 10E9/L (ref 0.8–5.3)
LYMPHOCYTES NFR BLD AUTO: 23.5 %
MCH RBC QN AUTO: 30.7 PG (ref 26.5–33)
MCHC RBC AUTO-ENTMCNC: 33.9 G/DL (ref 31.5–36.5)
MCV RBC AUTO: 91 FL (ref 78–100)
MONOCYTES # BLD AUTO: 0.5 10E9/L (ref 0–1.3)
MONOCYTES NFR BLD AUTO: 9.2 %
NEUTROPHILS # BLD AUTO: 3.7 10E9/L (ref 1.6–8.3)
NEUTROPHILS NFR BLD AUTO: 64 %
PLATELET # BLD AUTO: 232 10E9/L (ref 150–450)
RBC # BLD AUTO: 4.56 10E12/L (ref 3.8–5.2)
WBC # BLD AUTO: 5.8 10E9/L (ref 4–11)

## 2019-02-26 PROCEDURE — 84443 ASSAY THYROID STIM HORMONE: CPT | Performed by: NURSE PRACTITIONER

## 2019-02-26 PROCEDURE — 83721 ASSAY OF BLOOD LIPOPROTEIN: CPT | Performed by: NURSE PRACTITIONER

## 2019-02-26 PROCEDURE — 85025 COMPLETE CBC W/AUTO DIFF WBC: CPT | Performed by: NURSE PRACTITIONER

## 2019-02-26 PROCEDURE — 80053 COMPREHEN METABOLIC PANEL: CPT | Performed by: NURSE PRACTITIONER

## 2019-02-26 PROCEDURE — 36415 COLL VENOUS BLD VENIPUNCTURE: CPT | Performed by: NURSE PRACTITIONER

## 2019-02-26 PROCEDURE — 82306 VITAMIN D 25 HYDROXY: CPT | Performed by: NURSE PRACTITIONER

## 2019-02-26 PROCEDURE — 99214 OFFICE O/P EST MOD 30 MIN: CPT | Performed by: NURSE PRACTITIONER

## 2019-02-26 PROCEDURE — 85652 RBC SED RATE AUTOMATED: CPT | Performed by: NURSE PRACTITIONER

## 2019-02-26 RX ORDER — FLUOXETINE 40 MG/1
40 CAPSULE ORAL DAILY
Qty: 90 CAPSULE | Refills: 3 | Status: SHIPPED | OUTPATIENT
Start: 2019-02-26 | End: 2020-03-07

## 2019-02-26 RX ORDER — LEVOTHYROXINE SODIUM 50 UG/1
50 TABLET ORAL DAILY
Qty: 90 TABLET | Refills: 3 | Status: SHIPPED | OUTPATIENT
Start: 2019-02-26 | End: 2020-03-07

## 2019-02-26 ASSESSMENT — ANXIETY QUESTIONNAIRES
2. NOT BEING ABLE TO STOP OR CONTROL WORRYING: SEVERAL DAYS
IF YOU CHECKED OFF ANY PROBLEMS ON THIS QUESTIONNAIRE, HOW DIFFICULT HAVE THESE PROBLEMS MADE IT FOR YOU TO DO YOUR WORK, TAKE CARE OF THINGS AT HOME, OR GET ALONG WITH OTHER PEOPLE: SOMEWHAT DIFFICULT
7. FEELING AFRAID AS IF SOMETHING AWFUL MIGHT HAPPEN: SEVERAL DAYS
5. BEING SO RESTLESS THAT IT IS HARD TO SIT STILL: SEVERAL DAYS
6. BECOMING EASILY ANNOYED OR IRRITABLE: SEVERAL DAYS
3. WORRYING TOO MUCH ABOUT DIFFERENT THINGS: SEVERAL DAYS
GAD7 TOTAL SCORE: 7
1. FEELING NERVOUS, ANXIOUS, OR ON EDGE: SEVERAL DAYS

## 2019-02-26 ASSESSMENT — PATIENT HEALTH QUESTIONNAIRE - PHQ9
5. POOR APPETITE OR OVEREATING: SEVERAL DAYS
SUM OF ALL RESPONSES TO PHQ QUESTIONS 1-9: 13

## 2019-02-26 ASSESSMENT — MIFFLIN-ST. JEOR: SCORE: 1329.83

## 2019-02-26 NOTE — PROGRESS NOTES
SUBJECTIVE:   Gracie Aranda is a 53 year old female who presents to clinic today for the following health issues:      Depression Followup    Status since last visit: Improved     See PHQ-9 for current symptoms.  Other associated symptoms: None    Complicating factors:   Significant life event:  No   Current substance abuse:  None  Anxiety or Panic symptoms:  No    PHQ 10/31/2017 2/25/2018 2/26/2019   PHQ-9 Total Score 7 7 13   Q9: Suicide Ideation Not at all Not at all Several days   F/U: Thoughts of suicide or self-harm - - No   F/U: Safety concerns - - No   PHQ-9  English  PHQ-9   Any Language  Suicide Assessment Five-step Evaluation and Treatment (SAFE-T)    Amount of exercise or physical activity: 1-2 days/week for an average of 30-45 minutes    Problems taking medications regularly: No    Medication side effects: none    Diet: regular (no restrictions)    PHQ 9 score of 13, has thoughts of harming self no plan. SUSANNA 7 score of 7.  Taking fluoxetine 40 mg every day as prescribed.      Headaches    Duration: 1 month    Description  Location: R side of head and face   Character: throbbing pain  Frequency:  weekly  Duration:  All day    Intensity:  moderate    Accompanying signs and symptoms:    Precipitating or Alleviating factors:  Nausea/vomiting: no  Dizziness: no  Weakness or numbness: no  Visual changes: none  Fever: no   Sinus or URI symptoms no     History  Head trauma: no  Family history of migraines: no  Previous tests for headaches: no  Neurologist evaluations: no  Able to do daily activities when headache present: No  Wake with headaches: YES  Daily pain medication use: no  Any changes in: none    Precipitating or Alleviating factors (light/sound/sleep/caffeine): sleep  Therapies tried and outcome: Ibuprofen (Advil, Motrin) and Tylenol      For the last month has woken with severe headaches 1-2 times per week, pain located on the right side of the head and radiates down the face to the jaw, pain  "rated as 10/10, lasts most of the day, has taken naprosyn without relief.   The only way to get rid of the headache is to go back to sleep.  Light and sound sensitive. Denies nausea or vomiting.  Mother with a history of brain cancer. Denies history of headaches or migraines.     Hypothyroidism:  Taking levothyroxine 50 mcg as prescribed, last TSH was   2.14 in 10/2017 was 2.14.    Problem list and histories reviewed & adjusted, as indicated.  Additional history: as documented      Reviewed and updated as needed this visit by clinical staff  Tobacco  Allergies  Meds  Med Hx  Surg Hx  Fam Hx  Soc Hx      Reviewed and updated as needed this visit by Provider         ROS:  CONSTITUTIONAL: NEGATIVE for fever, chills, change in weight  ENT/MOUTH: NEGATIVE for ear, mouth and throat problems  RESP: NEGATIVE for significant cough or SOB  CV: NEGATIVE for chest pain, palpitations or peripheral edema  MUSCULOSKELETAL: NEGATIVE for significant arthralgias or myalgia  NEURO: NEGATIVE for weakness, dizziness or paresthesias  PSYCHIATRIC: NEGATIVE for changes in mood or affect    OBJECTIVE:     /60 (BP Location: Left arm, Patient Position: Chair, Cuff Size: Adult Regular)   Pulse 64   Temp 97  F (36.1  C) (Oral)   Resp 14   Ht 1.575 m (5' 2\")   Wt 76.7 kg (169 lb)   SpO2 97%   BMI 30.91 kg/m    Body mass index is 30.91 kg/m .   GENERAL: healthy, alert and no distress  NECK: no adenopathy, no asymmetry, masses, or scars and thyroid normal to palpation  RESP: lungs clear to auscultation - no rales, rhonchi or wheezes  CV: regular rate and rhythm, normal S1 S2, no S3 or S4, no murmur, click or rub, no peripheral edema and peripheral pulses strong  NEURO: MENTAL STATUS:  Alert, oriented x3.  Speech fluent with normal naming. CRANIAL NERVES:  Pupils are equal, round, reactive to light.  Extraocular movements full.  Visual fields full.  Facial sensation, movement normal.  Palate moves symmetrically.  Tongue midline.  " Sternocleidomastoid and trapezius strength intact.  Neck strength was normal.    NEUROLOGIC:  Motor 5/5.  Reflexes 2/4.  Good finger-nose-finger, romberg negative.  Normal heel to toe gait without ataxia.  Temporal arteries without tenderness  PSYCH: mentation appears normal, affect normal/bright    ASSESSMENT:   See below    PLAN:   Gracie was seen today for headache and depression.    Diagnoses and all orders for this visit:      Major depressive disorder, recurrent episode, mild (H):  PHQ 9 score of 13, has thoughts of harming self no plan. SUSANNA 7 score of 7.    -     FLUoxetine (PROZAC) 40 MG capsule; Take 1 capsule (40 mg) by mouth daily    Acute nonintractable headache, unspecified headache type: due to sudden onset of morning headaches twice a week that are severe will obtain MRI for further evaluation, will call with results.  Will also obtain ESR for further evaluation of temporal arteritis, no temporal artery tenderness on exam  -     MR Brain w/o Contrast; Future  -     CBC with platelets differential  -     Comprehensive metabolic panel  -     Erythrocyte sedimentation rate auto    Hypothyroidism, unspecified type  -     TSH with free T4 reflex  -     levothyroxine (SYNTHROID/LEVOTHROID) 50 MCG tablet; Take 1 tablet (50 mcg) by mouth daily    Vitamin D deficiency  -     Vitamin D Deficiency    CARDIOVASCULAR SCREENING; LDL GOAL LESS THAN 160  -     LDL cholesterol direct    Follow up  In 1 week, sooner as needed.     Susan Haase, APRN [facility]

## 2019-02-27 LAB
ALBUMIN SERPL-MCNC: 3.5 G/DL (ref 3.4–5)
ALP SERPL-CCNC: 68 U/L (ref 40–150)
ALT SERPL W P-5'-P-CCNC: 53 U/L (ref 0–50)
ANION GAP SERPL CALCULATED.3IONS-SCNC: 10 MMOL/L (ref 3–14)
AST SERPL W P-5'-P-CCNC: 27 U/L (ref 0–45)
BILIRUB SERPL-MCNC: 0.2 MG/DL (ref 0.2–1.3)
BUN SERPL-MCNC: 34 MG/DL (ref 7–30)
CALCIUM SERPL-MCNC: 8.8 MG/DL (ref 8.5–10.1)
CHLORIDE SERPL-SCNC: 106 MMOL/L (ref 94–109)
CO2 SERPL-SCNC: 23 MMOL/L (ref 20–32)
CREAT SERPL-MCNC: 0.73 MG/DL (ref 0.52–1.04)
GFR SERPL CREATININE-BSD FRML MDRD: >90 ML/MIN/{1.73_M2}
GLUCOSE SERPL-MCNC: 87 MG/DL (ref 70–99)
LDLC SERPL DIRECT ASSAY-MCNC: 100 MG/DL
POTASSIUM SERPL-SCNC: 4.5 MMOL/L (ref 3.4–5.3)
PROT SERPL-MCNC: 6.7 G/DL (ref 6.8–8.8)
SODIUM SERPL-SCNC: 139 MMOL/L (ref 133–144)
TSH SERPL DL<=0.005 MIU/L-ACNC: 3.6 MU/L (ref 0.4–4)

## 2019-02-27 ASSESSMENT — ANXIETY QUESTIONNAIRES: GAD7 TOTAL SCORE: 7

## 2019-02-28 LAB — DEPRECATED CALCIDIOL+CALCIFEROL SERPL-MC: 28 UG/L (ref 20–75)

## 2019-02-28 NOTE — RESULT ENCOUNTER NOTE
Julio C oBwman,  Your lab results are as below:  1)  TSH (thyroid level) 3.60 which is normal (range 0.4-4)  2)  Your LDL (bad cholesterol) is normal. Continue to follow a low cholesterol diet and we will recheck this in 1 year.  3)  Glucose is normal at 87 (normal fasting is <100).      If you have any questions do not hesitate to call the clinic to discuss the results with me further.     Sincerely,    Susan Haase, CNP

## 2019-03-01 ENCOUNTER — HOSPITAL ENCOUNTER (OUTPATIENT)
Dept: MRI IMAGING | Facility: CLINIC | Age: 54
Discharge: HOME | End: 2019-03-01
Attending: NURSE PRACTITIONER | Admitting: NURSE PRACTITIONER
Payer: COMMERCIAL

## 2019-03-01 DIAGNOSIS — R51.9 ACUTE NONINTRACTABLE HEADACHE, UNSPECIFIED HEADACHE TYPE: ICD-10-CM

## 2019-03-01 PROCEDURE — 70551 MRI BRAIN STEM W/O DYE: CPT

## 2019-03-05 DIAGNOSIS — G43.009 MIGRAINE WITHOUT AURA AND WITHOUT STATUS MIGRAINOSUS, NOT INTRACTABLE: Primary | ICD-10-CM

## 2019-03-05 RX ORDER — SUMATRIPTAN 50 MG/1
50 TABLET, FILM COATED ORAL
Qty: 15 TABLET | Refills: 1 | Status: SHIPPED | OUTPATIENT
Start: 2019-03-05 | End: 2020-03-07

## 2019-04-16 ENCOUNTER — OFFICE VISIT (OUTPATIENT)
Dept: FAMILY MEDICINE | Facility: CLINIC | Age: 54
End: 2019-04-16
Payer: COMMERCIAL

## 2019-04-16 VITALS
HEART RATE: 74 BPM | OXYGEN SATURATION: 100 % | BODY MASS INDEX: 29.81 KG/M2 | TEMPERATURE: 98 F | SYSTOLIC BLOOD PRESSURE: 100 MMHG | DIASTOLIC BLOOD PRESSURE: 60 MMHG | WEIGHT: 163 LBS | RESPIRATION RATE: 16 BRPM

## 2019-04-16 DIAGNOSIS — G43.009 MIGRAINE WITHOUT AURA AND WITHOUT STATUS MIGRAINOSUS, NOT INTRACTABLE: Primary | ICD-10-CM

## 2019-04-16 PROCEDURE — 99213 OFFICE O/P EST LOW 20 MIN: CPT | Performed by: NURSE PRACTITIONER

## 2019-04-16 ASSESSMENT — ANXIETY QUESTIONNAIRES
3. WORRYING TOO MUCH ABOUT DIFFERENT THINGS: MORE THAN HALF THE DAYS
GAD7 TOTAL SCORE: 6
GAD7 TOTAL SCORE: 6
5. BEING SO RESTLESS THAT IT IS HARD TO SIT STILL: NOT AT ALL
7. FEELING AFRAID AS IF SOMETHING AWFUL MIGHT HAPPEN: SEVERAL DAYS
1. FEELING NERVOUS, ANXIOUS, OR ON EDGE: NOT AT ALL
GAD7 TOTAL SCORE: 6
6. BECOMING EASILY ANNOYED OR IRRITABLE: SEVERAL DAYS
7. FEELING AFRAID AS IF SOMETHING AWFUL MIGHT HAPPEN: SEVERAL DAYS
2. NOT BEING ABLE TO STOP OR CONTROL WORRYING: MORE THAN HALF THE DAYS
4. TROUBLE RELAXING: NOT AT ALL

## 2019-04-16 ASSESSMENT — PATIENT HEALTH QUESTIONNAIRE - PHQ9
SUM OF ALL RESPONSES TO PHQ QUESTIONS 1-9: 10
SUM OF ALL RESPONSES TO PHQ QUESTIONS 1-9: 10
10. IF YOU CHECKED OFF ANY PROBLEMS, HOW DIFFICULT HAVE THESE PROBLEMS MADE IT FOR YOU TO DO YOUR WORK, TAKE CARE OF THINGS AT HOME, OR GET ALONG WITH OTHER PEOPLE: SOMEWHAT DIFFICULT

## 2019-04-16 NOTE — PROGRESS NOTES
SUBJECTIVE:   Gracie Aranda is a 53 year old female who presents to clinic today for the following health issues:    History of Present Illness     Migraines:     Headache Symptoms are:  Worsening    Migraine frequency::  4 per month    Migraine Duration::  1 day    Ability to perform ADL's::  No    Migraine Rescue/Relief Medications::  Sumatriptan (Imitrex)    Effectiveness of rescue/relief medications::  Total relief    Migraine Preventative Medications::  None    Neurological symptoms::  None    ER or UC Visits::  None    Getting, on average, 4 migraines per month. Patient is unsure of triggers: possibly change in sleep patterns (waking at 3-5am because of recently purchased cat, sleep by midnight), hunger. Last migraine began 04/14, took one dose of Imitrex and woke up relieved of symptoms. She described it as left-sided, radiated into left side of face. More intense than usual, throbbing sensation. Denies vision changes, nausea, vomiting with episode. Denies side effects of Imitrex.     Additional history: as documented  Reviewed and updated as needed this visit by clinical staff         Reviewed and updated as needed this visit by Provider       Patient Active Problem List   Diagnosis     Hypothyroidism     Vitamin D deficiency     CARDIOVASCULAR SCREENING; LDL GOAL LESS THAN 160     Hypocalcemia     Menopausal flushing     Major depressive disorder, recurrent episode, mild (H)     Past Surgical History:   Procedure Laterality Date     NO HISTORY OF SURGERY         Social History     Tobacco Use     Smoking status: Never Smoker     Smokeless tobacco: Never Used   Substance Use Topics     Alcohol use: Yes     Comment: rare     Family History   Problem Relation Age of Onset     Cancer Mother         brain tumor     Depression Sister      Diabetes Maternal Aunt      Diabetes Maternal Aunt          Current Outpatient Medications   Medication Sig Dispense Refill     FLUoxetine (PROZAC) 40 MG capsule Take 1  capsule (40 mg) by mouth daily 90 capsule 3     levothyroxine (SYNTHROID/LEVOTHROID) 50 MCG tablet Take 1 tablet (50 mcg) by mouth daily 90 tablet 3     multivitamin, therapeutic with minerals (MULTI-VITAMIN) TABS Take 1 tablet by mouth daily       SUMAtriptan (IMITREX) 50 MG tablet Take 1 tablet (50 mg) by mouth at onset of headache for migraine Repeat in 2 hours if needed, do not take more than 100 mg per 24 hours 15 tablet 1     Allergies   Allergen Reactions     No Known Drug Allergies        ROS:  Constitutional, cardiovascular, pulmonary,neuro,  and psych systems are negative, except as otherwise noted.    This document serves as a record of the services and decisions personally performed and made by Susan Haase, CNP. It was created on her behalf by Mariel Millan, a trained medical scribe. The creation of this document is based on the provider's statements to the medical scribe.  Mariel Millan 10:42 AM April 16, 2019  OBJECTIVE:   /60 (BP Location: Right arm, Patient Position: Chair, Cuff Size: Adult Regular)   Pulse 74   Temp 98  F (36.7  C) (Oral)   Resp 16   Wt 73.9 kg (163 lb)   SpO2 100%   BMI 29.81 kg/m    Body mass index is 29.81 kg/m .  GENERAL: healthy, alert and no distress  PSYCH: mentation appears normal, affect normal/bright    ASSESSMENT/PLAN:     Gracie was seen today for recheck medication.    Diagnoses and all orders for this visit:    Migraine without aura and without status migrainosus, not intractable: talked at length about triggers which she has found is the lack of sleep, works 3-11, cat is waking her up at 0430, relentless crying for at least 1 hour, she feels tired during the day, discussed setting an alarm for 0430 and getting up and feeding the cat to see if he will go back to sleep.  Also discussed that if migraines do not improve to start a controller medication on a daily basis.      Will call the clinic in 2-4 weeks with an update, if not improve will start on a daily  controller such as amitriptyline at bedtime.        The information in this document, created by the medical scribe for me, accurately reflects the services I personally performed and the decisions made by me. I have reviewed and approved this document for accuracy prior to leaving the patient care area.  April 16, 2019 10:44 AM  Susan Haase, APRN [facility]

## 2019-04-17 ASSESSMENT — ANXIETY QUESTIONNAIRES: GAD7 TOTAL SCORE: 6

## 2019-04-17 ASSESSMENT — PATIENT HEALTH QUESTIONNAIRE - PHQ9: SUM OF ALL RESPONSES TO PHQ QUESTIONS 1-9: 10

## 2019-05-16 ENCOUNTER — TELEPHONE (OUTPATIENT)
Dept: FAMILY MEDICINE | Facility: CLINIC | Age: 54
End: 2019-05-16

## 2019-05-16 NOTE — TELEPHONE ENCOUNTER
"Pt calls with [facility] for [employer].  Update migraines.   \"They are a lot better and also my hot flashes are better.\"   We asked for further details:  \"I am just not having as many migraines.\"   Declines to discuss severity, frequency, etc.     [phone]  Tod Lawrence RN    "

## 2019-09-30 ENCOUNTER — HEALTH MAINTENANCE LETTER (OUTPATIENT)
Age: 54
End: 2019-09-30

## 2019-11-26 ENCOUNTER — OFFICE VISIT (OUTPATIENT)
Dept: PODIATRY | Facility: CLINIC | Age: 54
End: 2019-11-26
Payer: COMMERCIAL

## 2019-11-26 VITALS — HEIGHT: 62 IN | SYSTOLIC BLOOD PRESSURE: 108 MMHG | DIASTOLIC BLOOD PRESSURE: 60 MMHG | BODY MASS INDEX: 29.81 KG/M2

## 2019-11-26 DIAGNOSIS — M79.672 LEFT FOOT PAIN: Primary | ICD-10-CM

## 2019-11-26 DIAGNOSIS — M72.2 PLANTAR FASCIITIS, LEFT: ICD-10-CM

## 2019-11-26 PROCEDURE — 99203 OFFICE O/P NEW LOW 30 MIN: CPT | Performed by: PODIATRIST

## 2019-11-26 RX ORDER — DICLOFENAC SODIUM 75 MG/1
75 TABLET, DELAYED RELEASE ORAL 2 TIMES DAILY
Qty: 28 TABLET | Refills: 0 | Status: SHIPPED | OUTPATIENT
Start: 2019-11-26 | End: 2020-03-07

## 2019-11-26 NOTE — LETTER
11/26/2019         RE: Gracie Aranda  [address]  [place] [facility] [id number]        Dear Colleague,    Thank you for referring your patient, Gracie Aranda, to the [facility]. Please see a copy of my visit note below.    PATIENT HISTORY:    Gracie Aranda is a 54 year old female who presents to clinic for pain to the left heel. Started about a month ago. Notes that she thinks it cause she gained some weight. Denies injury. Pain in the morning or when getting up from sitting. Can be 7/10 at its worst. Has tried inserts and stretching with minimal pain relief. Wondering what else can be done for it. Is a  and on feet all day.     Review of Systems:  Patient denies fever, chills, rash, wound, stiffness, numbness, weakness, heart burn, blood in stool, chest pain with activity, calf pain when walking, shortness of breath with activity, chronic cough, easy bleeding/bruising, swelling of ankles, excessive thirst, fatigue, depression, anxiety.  Patient admits to limping at times.     PAST MEDICAL HISTORY:   Past Medical History:   Diagnosis Date     Depressive disorder, not elsewhere classified         PAST SURGICAL HISTORY:   Past Surgical History:   Procedure Laterality Date     NO HISTORY OF SURGERY          MEDICATIONS:   Current Outpatient Medications:      FLUoxetine (PROZAC) 40 MG capsule, Take 1 capsule (40 mg) by mouth daily, Disp: 90 capsule, Rfl: 3     levothyroxine (SYNTHROID/LEVOTHROID) 50 MCG tablet, Take 1 tablet (50 mcg) by mouth daily, Disp: 90 tablet, Rfl: 3     multivitamin, therapeutic with minerals (MULTI-VITAMIN) TABS, Take 1 tablet by mouth daily, Disp: , Rfl:      SUMAtriptan (IMITREX) 50 MG tablet, Take 1 tablet (50 mg) by mouth at onset of headache for migraine Repeat in 2 hours if needed, do not take more than 100 mg per 24 hours, Disp: 15 tablet, Rfl: 1     ALLERGIES:    Allergies   Allergen Reactions     No Known Drug Allergies         SOCIAL HISTORY:    Social History     Socioeconomic History     Marital status: Single     Spouse name: Not on file     Number of children: Not on file     Years of education: Not on file     Highest education level: Not on file   Occupational History     Occupation:      Employer: [employer]   Social Needs     Financial resource strain: Not on file     Food insecurity:     Worry: Not on file     Inability: Not on file     Transportation needs:     Medical: Not on file     Non-medical: Not on file   Tobacco Use     Smoking status: Never Smoker     Smokeless tobacco: Never Used   Substance and Sexual Activity     Alcohol use: Yes     Comment: rare     Drug use: No     Sexual activity: Yes     Partners: Male   Lifestyle     Physical activity:     Days per week: Not on file     Minutes per session: Not on file     Stress: Not on file   Relationships     Social connections:     Talks on phone: Not on file     Gets together: Not on file     Attends [employer] service: Not on file     Active member of club or organization: Not on file     Attends meetings of clubs or organizations: Not on file     Relationship status: Not on file     Intimate partner violence:     Fear of current or ex partner: Not on file     Emotionally abused: Not on file     Physically abused: Not on file     Forced sexual activity: Not on file   Other Topics Concern      Service Not Asked     Blood Transfusions Not Asked     Caffeine Concern Not Asked     Occupational Exposure Not Asked     Hobby Hazards Not Asked     Sleep Concern Not Asked     Stress Concern Not Asked     Weight Concern Not Asked     Special Diet No     Comment: not a lot of fruits and veggies.     Back Care Not Asked     Exercise Yes     Comment: work and more     Bike Helmet Not Asked     Seat Belt Not Asked     Self-Exams Not Asked     Parent/sibling w/ CABG, MI or angioplasty before 65F 55M? Not Asked   Social History Narrative     Not on file        FAMILY  "HISTORY:   Family History   Problem Relation Age of Onset     Cancer Mother         brain tumor     Depression Sister      Diabetes Maternal Aunt      Diabetes Maternal Aunt         EXAM:Vitals: /60   Ht 1.575 m (5' 2\")   BMI 29.81 kg/m     BMI= Body mass index is 29.81 kg/m .    General appearance: Patient is alert and fully cooperative with history & exam.  No sign of distress is noted during the visit.     Psychiatric: Affect is pleasant & appropriate.  Patient appears motivated to improve health.     Respiratory: Breathing is regular & unlabored while sitting.     HEENT: Hearing is intact to spoken word.  Speech is clear.  No gross evidence of visual impairment that would impact ambulation.     Dermatologic: Skin is intact to both lower extremities without significant lesions, rash or abrasion.  No paronychia or evidence of soft tissue infection is noted.     Vascular: DP & PT pulses are intact & regular bilaterally.  No significant edema or varicosities noted.  CFT and skin temperature is normal to both lower extremities.     Neurologic: Lower extremity sensation is intact to light touch.  No evidence of weakness or contracture in the lower extremities.  No evidence of neuropathy.     Musculoskeletal: Patient is ambulatory without assistive device or brace.  Pain on palpation of the left plantar heel.      ASSESSMENT:    Left foot pain  Plantar fasciitis, left     PLAN:  Reviewed patient's chart in [facility]. The potential causes and nature of plantar fasciitis were discussed with the patient.  We reviewed the natural history/prognosis of the condition and risks if left untreated.  These include chronic pain, other sites of pain due to gait changes, and potential plantar fascial rupture.      We discussed possible causes of the condition as it relates to the patients specific situation.      Conservative treatment options were reviewed:  appropriate shoes, avoidance of barefoot walking, inserts/orthoses, " stretching, ice, massage, immobilization and NSAIDs.     We also reviewed the options of injection therapy and surgery.  However, it was made clear that surgery is only considered when conservative therapy fails.  The risks and benefits of injection therapy, and surgery were discussed.     After thorough discussion and answering all questions, the patient elected to wear a boot for next month. Also given a stronger anti inflammatory. Discussed if she develops stomach issues to stop taking it. Also continue stretches, apply topical pain cream. Follow up in 1 month for reassessment if not improved, recommend MRI of ankle. .          Kala Poon DPM, Podiatry/Foot and Ankle Surgery    Weight management plan: Patient was referred to their PCP to discuss a diet and exercise plan.      Again, thank you for allowing me to participate in the care of your patient.        Sincerely,        Kala Poon DPM, Podiatry/Foot and Ankle Surgery

## 2019-11-26 NOTE — PROGRESS NOTES
PATIENT HISTORY:    Gracie Aranda is a 54 year old female who presents to clinic for pain to the left heel. Started about a month ago. Notes that she thinks it cause she gained some weight. Denies injury. Pain in the morning or when getting up from sitting. Can be 7/10 at its worst. Has tried inserts and stretching with minimal pain relief. Wondering what else can be done for it. Is a  and on feet all day.     Review of Systems:  Patient denies fever, chills, rash, wound, stiffness, numbness, weakness, heart burn, blood in stool, chest pain with activity, calf pain when walking, shortness of breath with activity, chronic cough, easy bleeding/bruising, swelling of ankles, excessive thirst, fatigue, depression, anxiety.  Patient admits to limping at times.     PAST MEDICAL HISTORY:   Past Medical History:   Diagnosis Date     Depressive disorder, not elsewhere classified         PAST SURGICAL HISTORY:   Past Surgical History:   Procedure Laterality Date     NO HISTORY OF SURGERY          MEDICATIONS:   Current Outpatient Medications:      FLUoxetine (PROZAC) 40 MG capsule, Take 1 capsule (40 mg) by mouth daily, Disp: 90 capsule, Rfl: 3     levothyroxine (SYNTHROID/LEVOTHROID) 50 MCG tablet, Take 1 tablet (50 mcg) by mouth daily, Disp: 90 tablet, Rfl: 3     multivitamin, therapeutic with minerals (MULTI-VITAMIN) TABS, Take 1 tablet by mouth daily, Disp: , Rfl:      SUMAtriptan (IMITREX) 50 MG tablet, Take 1 tablet (50 mg) by mouth at onset of headache for migraine Repeat in 2 hours if needed, do not take more than 100 mg per 24 hours, Disp: 15 tablet, Rfl: 1     ALLERGIES:    Allergies   Allergen Reactions     No Known Drug Allergies         SOCIAL HISTORY:   Social History     Socioeconomic History     Marital status: Single     Spouse name: Not on file     Number of children: Not on file     Years of education: Not on file     Highest education level: Not on file   Occupational History     Occupation:  "     Employer: [employer]   Social Needs     Financial resource strain: Not on file     Food insecurity:     Worry: Not on file     Inability: Not on file     Transportation needs:     Medical: Not on file     Non-medical: Not on file   Tobacco Use     Smoking status: Never Smoker     Smokeless tobacco: Never Used   Substance and Sexual Activity     Alcohol use: Yes     Comment: rare     Drug use: No     Sexual activity: Yes     Partners: Male   Lifestyle     Physical activity:     Days per week: Not on file     Minutes per session: Not on file     Stress: Not on file   Relationships     Social connections:     Talks on phone: Not on file     Gets together: Not on file     Attends [employer] service: Not on file     Active member of club or organization: Not on file     Attends meetings of clubs or organizations: Not on file     Relationship status: Not on file     Intimate partner violence:     Fear of current or ex partner: Not on file     Emotionally abused: Not on file     Physically abused: Not on file     Forced sexual activity: Not on file   Other Topics Concern      Service Not Asked     Blood Transfusions Not Asked     Caffeine Concern Not Asked     Occupational Exposure Not Asked     Hobby Hazards Not Asked     Sleep Concern Not Asked     Stress Concern Not Asked     Weight Concern Not Asked     Special Diet No     Comment: not a lot of fruits and veggies.     Back Care Not Asked     Exercise Yes     Comment: work and more     Bike Helmet Not Asked     Seat Belt Not Asked     Self-Exams Not Asked     Parent/sibling w/ CABG, MI or angioplasty before 65F 55M? Not Asked   Social History Narrative     Not on file        FAMILY HISTORY:   Family History   Problem Relation Age of Onset     Cancer Mother         brain tumor     Depression Sister      Diabetes Maternal Aunt      Diabetes Maternal Aunt         EXAM:Vitals: /60   Ht 1.575 m (5' 2\")   BMI 29.81 kg/m    BMI= Body " mass index is 29.81 kg/m .    General appearance: Patient is alert and fully cooperative with history & exam.  No sign of distress is noted during the visit.     Psychiatric: Affect is pleasant & appropriate.  Patient appears motivated to improve health.     Respiratory: Breathing is regular & unlabored while sitting.     HEENT: Hearing is intact to spoken word.  Speech is clear.  No gross evidence of visual impairment that would impact ambulation.     Dermatologic: Skin is intact to both lower extremities without significant lesions, rash or abrasion.  No paronychia or evidence of soft tissue infection is noted.     Vascular: DP & PT pulses are intact & regular bilaterally.  No significant edema or varicosities noted.  CFT and skin temperature is normal to both lower extremities.     Neurologic: Lower extremity sensation is intact to light touch.  No evidence of weakness or contracture in the lower extremities.  No evidence of neuropathy.     Musculoskeletal: Patient is ambulatory without assistive device or brace.  Pain on palpation of the left plantar heel.      ASSESSMENT:    Left foot pain  Plantar fasciitis, left     PLAN:  Reviewed patient's chart in [facility]. The potential causes and nature of plantar fasciitis were discussed with the patient.  We reviewed the natural history/prognosis of the condition and risks if left untreated.  These include chronic pain, other sites of pain due to gait changes, and potential plantar fascial rupture.      We discussed possible causes of the condition as it relates to the patients specific situation.      Conservative treatment options were reviewed:  appropriate shoes, avoidance of barefoot walking, inserts/orthoses, stretching, ice, massage, immobilization and NSAIDs.     We also reviewed the options of injection therapy and surgery.  However, it was made clear that surgery is only considered when conservative therapy fails.  The risks and benefits of injection therapy, and  surgery were discussed.     After thorough discussion and answering all questions, the patient elected to wear a boot for next month. Also given a stronger anti inflammatory. Discussed if she develops stomach issues to stop taking it. Also continue stretches, apply topical pain cream. Follow up in 1 month for reassessment if not improved, recommend MRI of ankle. .          Kala Poon DPM, Podiatry/Foot and Ankle Surgery    Weight management plan: Patient was referred to their PCP to discuss a diet and exercise plan.

## 2019-11-26 NOTE — PATIENT INSTRUCTIONS
Thank you for choosing [facility]!    DR. BATES'S CLINIC SCHEDULE  MONDAY AM - [place] TUESDAY - [place]   [address] [address] [place]DANA [address] [address] [place] [address]   [phone] / [phone]       WEDNESDAY - [place] FRIDAY AM - WOUND [facility]   [address]   VANDANA Carranza [address] [place] [address]   [phone] / [phone]       FRIDAY PM - [place] SCHEDULE SURGERY: [phone]   [address] BILLING QUESTIONS: [phone]   VANDANA [place] [address] AFTER HOURS: [phone]   [phone] / [phone] APPOINTMENTS: [phone]     Consumer Price Line (CPL) [phone]     One month follow up      PLANTAR FASCIITIS  Plantar fasciitis is often referred to as heel spurs or heel pain. Plantar fasciitis is a very common problem that affects people of all foot shapes, age, weight and activity level. Pain may be in the arch or on the weight-bearing surface of the heel. The pain may come on without injury or identifiable cause. Pain is generally present when first getting out of bed in the morning or up from a seated break.     CAUSES  The plantar fascia is a dense fibrous band of tissue that stretches across the bottom surface of the foot. The fascia helps support the foot muscles and arch. Plantar fasciitis is thought to be caused by mechanical strain or overload. Frequent walking without shoes or wearing unsupportive shoes is thought to cause structural overload and ultimately inflammation of the plantar fascia. Some people have heel spurs that can be seen on x-ray. The heel spur is actually a minor component of plantar fascitis and is largely ignored.       SELF TREATMENT   The easiest solution is to stop walking around your home without shoes. Plantar fasciitis is largely a shoe problem. Shoes are either not being worn often enough or your current shoes are  inadequate for your weight, foot structure or activity level. The majority of shoes on the market today are not sufficient to resist development of plantar fasciitis or to promote healing. Assume that your current shoes are inadequate and will need to be replaced. Even high quality shoes wear out with 6 months to one year of frequent use. Weight loss is another option. Losing ten pounds in the next two months may be enough to resolve the problem. Ice applied to the area of pain two to three times per day for ten minutes each session can be very helpful. Warm foot soaks in epsom salts can also relieve pain. This should continue until the problem resolves. Achilles tendon stretching is essential. Stretch multiple times daily to promote healing and to prevent recurrence in the future. Over all stretching of the body is helpful as well such as the calves, thighs and lower back. Normally when one area of the body is tight, other areas are too. Gentle Yoga can be good for this.     Over the counter topical anti inflammatories can be helpful such as biofreeze, bengay, salon pas, ect...  Oral ibuprofen or aleve is recommended as well to try to calm down inflammation.     Night splints can be helpful to gradually stretch the foot at night as a lot of pain is when you get up in the morning. Taking a towel or thera band and stretching the foot back multiple times before you get ou of bed can be beneficial as well.     MEDICAL TREATMENT  Medical treatments often include custom arch supports, cortisone injections, physical therapy, splints to be worn in bed, prescription medications and surgery. The home treatments listed above will be necessary regardless of these advanced medical treatments. Surgery is rarely needed but is very helpful in selected cases.     PROGNOSIS  Plantar fasciitis can last from one day to a lifetime. Some people get intermittent fascitis that is very short-lived. Others suffer daily for years. Excessive  body weight, frequent bare foot walking, long hours on the feet, inadequate shoes, predisposing foot structures and excessive activity such as running are all potential issues that lead to chronic and/or recurring plantar fascitis. Having plantar fasciitis means that you are forever prone to this problem and will require modification of some of the above factors. Most people seek treatment within one to four months. Healing usually requires a similar one to four month time frame. Healing time is relative to the amount of effort spent treating the problem.   Plantar fasciitis is highly recurrent. Risk factors often continue, including return to bare foot walking, inadequate shoes, excessive body weight, excessive activities, etc. Your life style and foot structure may predispose you to recurrent plantar fasciitis. A daily prevention regimen can be very helpful. Ongoing use of shoe inserts, careful attention to appropriate shoes, daily Achilles stretching, etc. may prevent recurrence. Prompt attention at the earliest warning signs of heel pain can resolve the problem in as short as a few days.     EXERCISES  Stair Exercise: Step on the stairs with the ball of your foot and hold your position for at least 15 seconds, then slowly step down with the heels of your foot. You can do this daily and as often as you want.   Picking the Towel: Sit comfortably and then pick the towel up with your toes. You can use any object other than a towel as long as the material can be soft and you can pick it up with your toes.  Rolling the Bottle: Use a small ball or frozen water bottle and then roll it around with your foot.   Flex the Toes: Sit comfortably and then flex your toes by pointing it towards the floor or towards your body. This will relax and flex your foot and exercise your plantar fascia, the calf, and the Achilles tendon. The inability of the foot to stretch often causes the bunching up of the plantar fascia area leading  to the pain.  Calf/Achilles Stretching: Lay on you back and raise one foot, then point your toes towards the floor. See photo below:               Hold each stretch for 10 seconds. Stretch 10 times per set, three sets per day. Morning, afternoon and evening. If your heel pain is very severe in the morning, consider doing the first set of stretches before you get out of bed.      OVER THE COUNTER INSERT RECOMMENDATIONS  SuperFeet   Sofsole Fit Spenco   Power Step   Walk-Fit Arch Cradles     Most of these can be found at your local [employer], or online.  **A good high quality over the counter insert should cost around $40-$50      [facility] LOCATIONS  [place]  [address] [place] [address]  [phone]   [address]  [phone] [place]  [address] [facility]  [phone]   [place]  [address] [facility] [address]  [phone]   [place]  [address]  [phone] [place] [address]  [phone]   [place]  [address]  [phone]   [place]  [address] [place] [address]  [phone] [place]  [address]  [phone]             BODY WEIGHT AND YOUR FEET  The following information is included in the after visit summary for all patients. Body weight can be a sensitive issue to discuss in clinic, but we think the following information is very important. Although we focus on the feet and ankles, we do support the overall health of our patients.     Many things can cause foot and ankle problems. Foot structure, activity level, foot mechanics and injuries are common causes of pain. One very important issue that often goes unmentioned, is body weight. Extra weight can cause increased stress on muscles, ligaments, bones and tendons. Sometimes just a few extra pounds is all it takes to put one over her/his threshold. Without reducing that stress, it can be difficult to alleviate pain. As Foot & Ankle specialists, our job is addressing the  lower extremity problem and possible causes. Regarding extra body weight, we encourage patients to discuss diet and weight management plans with their primary care doctors. It is this team approach that gives you the best opportunity for pain relief and getting you back on your feet.      [facility] has a Comprehensive Weight Management Program. This program includes counseling, education, non-surgical and surgical approaches to weight loss. If you are interested in learning more either talk to you primary care provider or call [phone].

## 2019-12-09 ENCOUNTER — ANCILLARY PROCEDURE (OUTPATIENT)
Dept: MAMMOGRAPHY | Facility: CLINIC | Age: 54
End: 2019-12-09
Payer: COMMERCIAL

## 2019-12-09 DIAGNOSIS — Z12.31 VISIT FOR SCREENING MAMMOGRAM: ICD-10-CM

## 2019-12-09 PROCEDURE — 77063 BREAST TOMOSYNTHESIS BI: CPT | Mod: TC

## 2019-12-09 PROCEDURE — 77067 SCR MAMMO BI INCL CAD: CPT | Mod: TC

## 2020-01-14 ENCOUNTER — OFFICE VISIT (OUTPATIENT)
Dept: PODIATRY | Facility: CLINIC | Age: 55
End: 2020-01-14
Payer: COMMERCIAL

## 2020-01-14 VITALS
WEIGHT: 165 LBS | SYSTOLIC BLOOD PRESSURE: 104 MMHG | DIASTOLIC BLOOD PRESSURE: 64 MMHG | BODY MASS INDEX: 30.36 KG/M2 | HEIGHT: 62 IN

## 2020-01-14 DIAGNOSIS — M79.672 LEFT FOOT PAIN: Primary | ICD-10-CM

## 2020-01-14 DIAGNOSIS — M72.2 PLANTAR FASCIITIS, LEFT: ICD-10-CM

## 2020-01-14 PROCEDURE — 99213 OFFICE O/P EST LOW 20 MIN: CPT | Performed by: PODIATRIST

## 2020-01-14 RX ORDER — INDOMETHACIN 50 MG/1
50 CAPSULE ORAL 2 TIMES DAILY WITH MEALS
Qty: 28 CAPSULE | Refills: 0 | Status: SHIPPED | OUTPATIENT
Start: 2020-01-14 | End: 2020-03-07

## 2020-01-14 ASSESSMENT — MIFFLIN-ST. JEOR: SCORE: 1306.69

## 2020-01-14 NOTE — PATIENT INSTRUCTIONS
Thank you for choosing [facility] / Foot & Ankle Surgery!    DR. BATES'S CLINIC SCHEDULE  MONDAY AM - [place] TUESDAY - [place]   [address] [address] [address] Bertrand [address] [place] [address]   [phone] / [phone]       WEDNESDAY - [place] FRIDAY AM - [facility]   [address]   V[place]DANA Carranza [address] [place] [id number] [phone] / [phone]       FRIDAY PM - [place] SCHEDULE SURGERY: [phone]   [address] BILLING QUESTIONS: [phone]   [place] [address] AFTER HOURS: [phone] APPOINTMENTS: [phone]     Consumer Price Line ([facility]) [phone]     FOLLOW UP:  We will call with MRI results      Home Medical Equipment:  1. [facility]    [facility] -[address]  [address]   [place] [phone]     2. [facility] - [address], [place], [phone]          Please call to schedule your MRI/CT/Ultrasound/Arthrogram appointment.  The number is [phone].          PLANTAR FASCIITIS  Plantar fasciitis is often referred to as heel spurs or heel pain. Plantar fasciitis is a very common problem that affects people of all foot shapes, age, weight and activity level. Pain may be in the arch or on the weight-bearing surface of the heel. The pain may come on without injury or identifiable cause. Pain is generally present when first getting out of bed in the morning or up from a seated break.     CAUSES  The plantar fascia is a dense fibrous band of tissue that stretches across the bottom surface of the foot. The fascia helps support the foot muscles and arch. Plantar fasciitis is thought to be caused by mechanical strain or overload. Frequent walking without shoes or wearing unsupportive shoes is thought to  cause structural overload and ultimately inflammation of the plantar fascia. Some people have heel spurs that can be seen on x-ray. The heel spur is actually a minor component of plantar fascitis and is largely ignored.       SELF TREATMENT   The easiest solution is to stop walking around your home without shoes. Plantar fasciitis is largely a shoe problem. Shoes are either not being worn often enough or your current shoes are inadequate for your weight, foot structure or activity level. The majority of shoes on the market today are not sufficient to resist development of plantar fasciitis or to promote healing. Assume that your current shoes are inadequate and will need to be replaced. Even high quality shoes wear out with 6 months to one year of frequent use. Weight loss is another option. Losing ten pounds in the next two months may be enough to resolve the problem. Ice applied to the area of pain two to three times per day for ten minutes each session can be very helpful. Warm foot soaks in epsom salts can also relieve pain. This should continue until the problem resolves. Achilles tendon stretching is essential. Stretch multiple times daily to promote healing and to prevent recurrence in the future. Over all stretching of the body is helpful as well such as the calves, thighs and lower back. Normally when one area of the body is tight, other areas are too. Gentle Yoga can be good for this.     Over the counter topical anti inflammatories can be helpful such as biofreeze, bengay, salon pas, ect...  Oral ibuprofen or aleve is recommended as well to try to calm down inflammation.     Night splints can be helpful to gradually stretch the foot at night as a lot of pain is when you get up in the morning. Taking a towel or thera band and stretching the foot back multiple times before you get ou of bed can be beneficial as well.     MEDICAL TREATMENT  Medical treatments often include custom arch supports, cortisone  injections, physical therapy, splints to be worn in bed, prescription medications and surgery. The home treatments listed above will be necessary regardless of these advanced medical treatments. Surgery is rarely needed but is very helpful in selected cases.     PROGNOSIS  Plantar fasciitis can last from one day to a lifetime. Some people get intermittent fascitis that is very short-lived. Others suffer daily for years. Excessive body weight, frequent bare foot walking, long hours on the feet, inadequate shoes, predisposing foot structures and excessive activity such as running are all potential issues that lead to chronic and/or recurring plantar fascitis. Having plantar fasciitis means that you are forever prone to this problem and will require modification of some of the above factors. Most people seek treatment within one to four months. Healing usually requires a similar one to four month time frame. Healing time is relative to the amount of effort spent treating the problem.   Plantar fasciitis is highly recurrent. Risk factors often continue, including return to bare foot walking, inadequate shoes, excessive body weight, excessive activities, etc. Your life style and foot structure may predispose you to recurrent plantar fasciitis. A daily prevention regimen can be very helpful. Ongoing use of shoe inserts, careful attention to appropriate shoes, daily Achilles stretching, etc. may prevent recurrence. Prompt attention at the earliest warning signs of heel pain can resolve the problem in as short as a few days.     EXERCISES  Stair Exercise: Step on the stairs with the ball of your foot and hold your position for at least 15 seconds, then slowly step down with the heels of your foot. You can do this daily and as often as you want.   Picking the Towel: Sit comfortably and then pick the towel up with your toes. You can use any object other than a towel as long as the material can be soft and you can pick it up  with your toes.  Rolling the Bottle: Use a small ball or frozen water bottle and then roll it around with your foot.   Flex the Toes: Sit comfortably and then flex your toes by pointing it towards the floor or towards your body. This will relax and flex your foot and exercise your plantar fascia, the calf, and the Achilles tendon. The inability of the foot to stretch often causes the bunching up of the plantar fascia area leading to the pain.  Calf/Achilles Stretching: Lay on you back and raise one foot, then point your toes towards the floor. See photo below:               Hold each stretch for 10 seconds. Stretch 10 times per set, three sets per day. Morning, afternoon and evening. If your heel pain is very severe in the morning, consider doing the first set of stretches before you get out of bed.      OVER THE COUNTER INSERT RECOMMENDATIONS  SuperFeet   Sofsole Fit Spenco   Power Step   Walk-Fit Arch Cradles     Most of these can be found at your local [employer], or online.  **A good high quality over the counter insert should cost around $40-$50      [facility]  [address] [place] [address]  [phone]   [address]  [phone] [place]  [address] [facility]  [phone]   [place]  [address]  [phone]   [place]  [address] [facility]  [phone] [place] [address]  [phone]   [place]  [address] [facility]  [phone]   [place]  [address]  [phone] [place]  [address]  [phone]               BODY WEIGHT AND YOUR FEET  The following information is included in the after visit summary for all patients. Body weight can be a sensitive issue to discuss in clinic, but we think the following information is very important. Although we focus on the feet and ankles, we do support the overall health of our patients.     Many things can cause foot and ankle problems. Foot  structure, activity level, foot mechanics and injuries are common causes of pain. One very important issue that often goes unmentioned, is body weight. Extra weight can cause increased stress on muscles, ligaments, bones and tendons. Sometimes just a few extra pounds is all it takes to put one over her/his threshold. Without reducing that stress, it can be difficult to alleviate pain. As Foot & Ankle specialists, our job is addressing the lower extremity problem and possible causes. Regarding extra body weight, we encourage patients to discuss diet and weight management plans with their primary care doctors. It is this team approach that gives you the best opportunity for pain relief and getting you back on your feet.      [facility] has a Comprehensive Weight Management Program. This program includes counseling, education, non-surgical and surgical approaches to weight loss. If you are interested in learning more either talk to you primary care provider or call [phone].

## 2020-01-14 NOTE — PROGRESS NOTES
"Podiatry / Foot and Ankle Surgery Progress Note    January 14, 2020    Subject: Patient was seen for follow up on continued pain to the left heel. Notes that she \"tried the boot\" but it made her hip sore. Notes inserts made pain worse. Did not get the oral medication that was ordered. Pain is still 10/10. Worse in the morning or after being on foot for a short period of time. Wondering what else can be done.     Objective:  Vitals: /64   Ht 1.575 m (5' 2\")   BMI 29.81 kg/m    BMI= Body mass index is 29.81 kg/m .    General:  Patient is alert and orientated.  NAD    Dermatologic: Skin is intact to both lower extremities without significant lesions, rash or abrasion.  No paronychia or evidence of soft tissue infection is noted.     Vascular: DP & PT pulses are intact & regular bilaterally.  No significant edema or varicosities noted.  CFT and skin temperature is normal to both lower extremities.     Neurologic: Lower extremity sensation is intact to light touch.  No evidence of weakness or contracture in the lower extremities.  No evidence of neuropathy.     Musculoskeletal: Patient is ambulatory without assistive device or brace.  Pain on palpation of the left plantar heel is the same.      ASSESSMENT:    Left foot pain  Plantar fasciitis, left     PLAN:  Reviewed patient[facility] chart in [facility]. At this time, due to continued pain, recommend MRI. If negative, will order EMG. If plantar fasciitis, recommend injection or PT.  Again recommend not going barefoot or in socks at home. Reordered oral anti inflammatory medication and night splint. Will call with mRI results. Talked about an even up for the right foot to help the unevenness but patient declined.       Kala Poon DPM, Podiatry/Foot and Ankle Surgery    Weight management plan: Patient was referred to their PCP to discuss a diet and exercise plan.    "

## 2020-01-14 NOTE — LETTER
"    1/14/2020         RE: Gracie Aranda  [address]  [facility] [address]        Dear Colleague,    Thank you for referring your patient, Gracie Aranda, to the [facility]. Please see a copy of my visit note below.    Podiatry / Foot and Ankle Surgery Progress Note    January 14, 2020    Subject: Patient was seen for follow up on continued pain to the left heel. Notes that she \"tried the boot\" but it made her hip sore. Notes inserts made pain worse. Did not get the oral medication that was ordered. Pain is still 10/10. Worse in the morning or after being on foot for a short period of time. Wondering what else can be done.     Objective:  Vitals: /64   Ht 1.575 m (5' 2\")   BMI 29.81 kg/m     BMI= Body mass index is 29.81 kg/m .    General:  Patient is alert and orientated.  NAD    Dermatologic: Skin is intact to both lower extremities without significant lesions, rash or abrasion.  No paronychia or evidence of soft tissue infection is noted.     Vascular: DP & PT pulses are intact & regular bilaterally.  No significant edema or varicosities noted.  CFT and skin temperature is normal to both lower extremities.     Neurologic: Lower extremity sensation is intact to light touch.  No evidence of weakness or contracture in the lower extremities.  No evidence of neuropathy.     Musculoskeletal: Patient is ambulatory without assistive device or brace.  Pain on palpation of the left plantar heel is the same.      ASSESSMENT:    Left foot pain  Plantar fasciitis, left     PLAN:  Reviewed patient's chart in [facility]. At this time, due to continued pain, recommend MRI. If negative, will order EMG. If plantar fasciitis, recommend injection or PT.  Again recommend not going barefoot or in socks at home. Reordered oral anti inflammatory medication and night splint. Will call with mRI results. Talked about an even up for the right foot to help the unevenness but patient declined.       Kala" KENIA Poon DPM, Podiatry/Foot and Ankle Surgery    Weight management plan: Patient was referred to their PCP to discuss a diet and exercise plan.      Again, thank you for allowing me to participate in the care of your patient.        Sincerely,        Kala Poon DPM, Podiatry/Foot and Ankle Surgery

## 2020-01-20 ENCOUNTER — HOSPITAL ENCOUNTER (OUTPATIENT)
Dept: MRI IMAGING | Facility: CLINIC | Age: 55
Discharge: HOME | End: 2020-01-20
Attending: PODIATRIST | Admitting: PODIATRIST
Payer: COMMERCIAL

## 2020-01-20 DIAGNOSIS — M72.2 PLANTAR FASCIITIS, LEFT: ICD-10-CM

## 2020-01-20 DIAGNOSIS — M79.672 LEFT FOOT PAIN: ICD-10-CM

## 2020-01-20 PROCEDURE — 73721 MRI JNT OF LWR EXTRE W/O DYE: CPT | Mod: LT

## 2020-01-24 ENCOUNTER — TELEPHONE (OUTPATIENT)
Dept: FAMILY MEDICINE | Facility: CLINIC | Age: 55
End: 2020-01-24

## 2020-01-24 NOTE — TELEPHONE ENCOUNTER
Summary:    Patient is due/failing the following:   FIT and PHQ9    Reviewed:  [x] CARE EVERYWHERE  [x] LAST OV NOTE INCLUDING ENDO  [x] FYI TAB  [x] LAST PANEL ENCOUNTER  [x] FUTURE APTS  [x] MYCHART STATUS   [x] IMMUNIZATIONS  Action needed:   Patient needs to do PHQ9. and Patient needs referral/order: FIT    Type of outreach:    Pt as a appt on 1/27/2020                                                                               Veda KNOTT

## 2020-01-27 ENCOUNTER — OFFICE VISIT (OUTPATIENT)
Dept: PODIATRY | Facility: CLINIC | Age: 55
End: 2020-01-27
Payer: COMMERCIAL

## 2020-01-27 VITALS
HEIGHT: 62 IN | SYSTOLIC BLOOD PRESSURE: 120 MMHG | DIASTOLIC BLOOD PRESSURE: 70 MMHG | BODY MASS INDEX: 30.36 KG/M2 | WEIGHT: 165 LBS

## 2020-01-27 DIAGNOSIS — M72.2 PLANTAR FASCIITIS, LEFT: ICD-10-CM

## 2020-01-27 DIAGNOSIS — M76.62 TENDONITIS, ACHILLES, LEFT: ICD-10-CM

## 2020-01-27 DIAGNOSIS — M79.672 LEFT FOOT PAIN: Primary | ICD-10-CM

## 2020-01-27 PROCEDURE — 20550 NJX 1 TENDON SHEATH/LIGAMENT: CPT | Mod: LT | Performed by: PODIATRIST

## 2020-01-27 PROCEDURE — 99213 OFFICE O/P EST LOW 20 MIN: CPT | Mod: 25 | Performed by: PODIATRIST

## 2020-01-27 RX ORDER — DEXAMETHASONE SODIUM PHOSPHATE 4 MG/ML
4 INJECTION, SOLUTION INTRA-ARTICULAR; INTRALESIONAL; INTRAMUSCULAR; INTRAVENOUS; SOFT TISSUE ONCE
Qty: 30 ML | Refills: 0 | Status: SHIPPED | OUTPATIENT
Start: 2020-01-27 | End: 2020-03-07

## 2020-01-27 ASSESSMENT — MIFFLIN-ST. JEOR: SCORE: 1306.69

## 2020-01-27 NOTE — LETTER
"    1/27/2020         RE: Gracie Aranda  [address] [facility] [address]        Dear Colleague,    Thank you for referring your patient, Gracie Aranda, to the [facility]. Please see a copy of my visit note below.    Podiatry / Foot and Ankle Surgery Progress Note    January 27, 2020    Subject: Patient was seen for follow up on left heel pain and MRI results. Notes she has been back in the boot which helps. Pain out of boot is 9/10. Pain in boot is 1-2/10.  Would like injection today.     Objective:  Vitals: /70   Ht 1.575 m (5' 2\")   Wt 74.8 kg (165 lb)   BMI 30.18 kg/m     BMI= Body mass index is 30.18 kg/m .    General:  Patient is alert and orientated.  NAD  Vascular:  DP and PT pulses are palpable.  No varicosities noted  CFT's < 3secs.  Skin temp is normal  Neuro:  Light and gross touch sensation intact to digits, dorsum, and plantar aspects of the feet.  Derm:  Skin is supple.  No rashes, lesions, or ulcerations noted.  Musculoskeletal:  No foot deformity noted.      Imaging: MRI left foot -   1. Plantar fasciitis involving the central cord plantar aponeurosis  with minimal partial thickness tear of the calcaneal insertional  fibers. Mild regional soft tissue tissue edema is noted.  2. Long segment Achilles tendinosis with low-grade partial-thickness  intrasubstance tear.  3. Tenosynovitis of the peroneus longus and brevis tendons.  4. Severely diminutive anterior talofibular ligament, presumably  sequelae of remote injury.  5. Irregular appearance of the intraosseous Lisfranc ligament,  questionably related to remote injury or possible normal variant.  Correlate with physical exam point tenderness if there is clinical  concern for injury.    Assessment:    Left foot pain  Plantar fasciitis, left  Tendonitis, Achilles, left    Plan:  Reviewed mri results. Again discussed plantar fasciitis. She would like injection today. Will also order physical therapy. Will kailyn with " questions or concerns.     Procedure: After verbal consent, the patients max point of tenderness was marked out on the left plantar heel.  This area was prepped and draped using sterile technique.  An injection of 1cc of 1% lidocaine plain and 1 1/2 cc of Kenalog-40 was injected into the max point of tenderness.  This was distributed in a fanning motion.  Patient tolerated the procedure and anesthesia well.      Kala Poon DPM, Podiatry/Foot and Ankle Surgery    Weight management plan: Patient was referred to their PCP to discuss a diet and exercise plan.      Again, thank you for allowing me to participate in the care of your patient.        Sincerely,        Kala Poon DPM, Podiatry/Foot and Ankle Surgery

## 2020-01-27 NOTE — PATIENT INSTRUCTIONS
Thank you for choosing [facility] Podiatry / Foot & Ankle Surgery!    DR[facility] SCHEDULE  MONDAY AM - [place] TUESDAY - [place]   [address] [address] [address] [address] [address] [place] [address]   [phone] / [phone] / [phone]       WEDNESDAY - [place] FRIDAY AM - [facility]   [address]   [place] [address] [place] [address]   [phone] / [phone]       FRIDAY PM - [place] SCHEDULE SURGERY: [phone]   [address] BILLING QUESTIONS: [phone]   [place] [address] AFTER HOURS: [phone]   [phone] / [phone] APPOINTMENTS: [phone]     Consumer Price Line (CPL) [phone]     FOLLOW UP:  As needed        PHYSICAL THERAPY REFERRAL  [facility] ([facility])  [phone]            PLANTAR FASCIITIS  Plantar fasciitis is often referred to as heel spurs or heel pain. Plantar fasciitis is a very common problem that affects people of all foot shapes, age, weight and activity level. Pain may be in the arch or on the weight-bearing surface of the heel. The pain may come on without injury or identifiable cause. Pain is generally present when first getting out of bed in the morning or up from a seated break.     CAUSES  The plantar fascia is a dense fibrous band of tissue that stretches across the bottom surface of the foot. The fascia helps support the foot muscles and arch. Plantar fasciitis is thought to be caused by mechanical strain or overload. Frequent walking without shoes or wearing unsupportive shoes is thought to cause structural overload and ultimately inflammation of the plantar fascia. Some people have heel spurs that can be seen on x-ray. The heel spur is actually a minor component of plantar fascitis and is largely ignored.       SELF TREATMENT   The easiest solution is to stop walking around your home without shoes.  Plantar fasciitis is largely a shoe problem. Shoes are either not being worn often enough or your current shoes are inadequate for your weight, foot structure or activity level. The majority of shoes on the market today are not sufficient to resist development of plantar fasciitis or to promote healing. Assume that your current shoes are inadequate and will need to be replaced. Even high quality shoes wear out with 6 months to one year of frequent use. Weight loss is another option. Losing ten pounds in the next two months may be enough to resolve the problem. Ice applied to the area of pain two to three times per day for ten minutes each session can be very helpful. Warm foot soaks in epsom salts can also relieve pain. This should continue until the problem resolves. Achilles tendon stretching is essential. Stretch multiple times daily to promote healing and to prevent recurrence in the future. Over all stretching of the body is helpful as well such as the calves, thighs and lower back. Normally when one area of the body is tight, other areas are too. Gentle Yoga can be good for this.     Over the counter topical anti inflammatories can be helpful such as biofreeze, bengay, salon pas, ect...  Oral ibuprofen or aleve is recommended as well to try to calm down inflammation.     Night splints can be helpful to gradually stretch the foot at night as a lot of pain is when you get up in the morning. Taking a towel or thera band and stretching the foot back multiple times before you get ou of bed can be beneficial as well.     MEDICAL TREATMENT  Medical treatments often include custom arch supports, cortisone injections, physical therapy, splints to be worn in bed, prescription medications and surgery. The home treatments listed above will be necessary regardless of these advanced medical treatments. Surgery is rarely needed but is very helpful in selected cases.     PROGNOSIS  Plantar fasciitis can last from one day to a  lifetime. Some people get intermittent fascitis that is very short-lived. Others suffer daily for years. Excessive body weight, frequent bare foot walking, long hours on the feet, inadequate shoes, predisposing foot structures and excessive activity such as running are all potential issues that lead to chronic and/or recurring plantar fascitis. Having plantar fasciitis means that you are forever prone to this problem and will require modification of some of the above factors. Most people seek treatment within one to four months. Healing usually requires a similar one to four month time frame. Healing time is relative to the amount of effort spent treating the problem.   Plantar fasciitis is highly recurrent. Risk factors often continue, including return to bare foot walking, inadequate shoes, excessive body weight, excessive activities, etc. Your life style and foot structure may predispose you to recurrent plantar fasciitis. A daily prevention regimen can be very helpful. Ongoing use of shoe inserts, careful attention to appropriate shoes, daily Achilles stretching, etc. may prevent recurrence. Prompt attention at the earliest warning signs of heel pain can resolve the problem in as short as a few days.     EXERCISES  Stair Exercise: Step on the stairs with the ball of your foot and hold your position for at least 15 seconds, then slowly step down with the heels of your foot. You can do this daily and as often as you want.   Picking the Towel: Sit comfortably and then pick the towel up with your toes. You can use any object other than a towel as long as the material can be soft and you can pick it up with your toes.  Rolling the Bottle: Use a small ball or frozen water bottle and then roll it around with your foot.   Flex the Toes: Sit comfortably and then flex your toes by pointing it towards the floor or towards your body. This will relax and flex your foot and exercise your plantar fascia, the calf, and the  Achilles tendon. The inability of the foot to stretch often causes the bunching up of the plantar fascia area leading to the pain.  Calf/Achilles Stretching: Lay on you back and raise one foot, then point your toes towards the floor. See photo below:               Hold each stretch for 10 seconds. Stretch 10 times per set, three sets per day. Morning, afternoon and evening. If your heel pain is very severe in the morning, consider doing the first set of stretches before you get out of bed.      OVER THE COUNTER INSERT RECOMMENDATIONS  SuperFeet   Sofsole Fit Spenco   Power Step   Walk-Fit Arch Cradles     Most of these can be found at your local "[employer], or online.  **A good high quality over the counter insert should cost around $40-$50      [place] [address]  [phone]   [address]  [phone] [place]  [address] [place] [address]  [phone]   [place]  [address] [facility] [address]  [phone]   [place]  [address] [facility]  [phone] [place] [address]  [phone]   [place]  [address]  [phone]   [place]  [address] [place] [address]  [phone] [place]  [address]  [phone]           ACHILLES TENDON LENGTHENING  The Achilles tendon and calf muscles play a critical role in normal foot and ankle function. Tightness of the muscle and tendon complex prohibits the normal amount of ankle flexibility. Inadequate ankle dorsiflexion, also known as equinus, results in compensatory stress throughout the leg and foot.     Unnatural stress on the foot results in arch collapse, foot pronation, plantar fasciitis, pain in the ball of the foot, hammer toes, bunions, arthritis, foot wounds, etc. The knee, hip, and leg muscles may also be affected by ankle stiffness. People with diabetes have additional problems with tightness of the Achilles tendon. The combination of foot numbness and a  tight Achilles may lead to pressure sores in the ball of the foot. Bone breakdown through the midfoot may also occur as a complication of equinus.     Initial treatment might involve stretching exercises. Stretching will keep the muscles loose but you will not likely accomplish making the tendon longer. Daily Achilles stretching is important   nonetheless. Surgical lengthening of the tendon is often required. .   Surgery can be performed in several ways. The tendon can be lengthened at the ankle level (Achilles lengthening) or in the mid calf (Gastroc lengthening). Achilles and gastrocnemius lengthening can be performed as an isolated procedure or in combination with surgical procedures for other conditions.   Lengthening procedures are most commonly performed for treatment of flatfoot deformity, contracture associated with diabetes related forefoot and midfoot ulcers, contracture after injury or stroke and to treat Achilles tendonitis.   The goal of surgery is to make the tendon longer yet still preserve function of the calf muscles. Most surgical patients do not have noticeable weakness once they recover from the surgery.     The main potential hazard of surgery involves tendon rupture during the healing process. Rupture is not common but could potentially occur. The tendon can also become excessively lengthened, especially from the stress of premature walking before the tendon is healed. Tendon adhesion, skin adhesion and nerve irritation or numbness can also occur.    CALF AND ACHILLES TENDON STRECHES   Stretch gently, do not bounce.   Do each set of stretches three times a day while you are having symptoms.   Do each set of stretches once a day after symptoms are relieved.     1. Towel Stretch     Sit on hard surface with one leg straight out in front of you.     Loop a towel around the ball of the foot and pull the towel to your body.     Be sure to keep your leg straight.     You should feel tension in the  calf muscle of the straight leg.     Repeat 3 times on each side for at least 15 seconds each.     2. Standing Calf Stretch     Stand about a foot from a wall and extend one leg behind you.     Keep both feet flat on the floor, toes pointed straight ahead, and the rear knee  straight.     Lean into the wall until you feel tension in the rear leg.     Hold for at least 15 seconds.     Repeat 2 times on each side.     3. Standing Achilles Tendon Stretch     Get into position of Standing Calf Stretch.     Lower the hips downward as you slightly bend the knee of the rear leg.     Keep both feet flat on the floor and toes straight ahead.     Hold for at least 15 seconds.     Repeat 2 times on each side.           BODY WEIGHT AND YOUR FEET  The following information is included in the after visit summary for all patients. Body weight can be a sensitive issue to discuss in clinic, but we think the following information is very important. Although we focus on the feet and ankles, we do support the overall health of our patients.     Many things can cause foot and ankle problems. Foot structure, activity level, foot mechanics and injuries are common causes of pain. One very important issue that often goes unmentioned, is body weight. Extra weight can cause increased stress on muscles, ligaments, bones and tendons. Sometimes just a few extra pounds is all it takes to put one over her/his threshold. Without reducing that stress, it can be difficult to alleviate pain. As Foot & Ankle specialists, our job is addressing the lower extremity problem and possible causes. Regarding extra body weight, we encourage patients to discuss diet and weight management plans with their primary care doctors. It is this team approach that gives you the best opportunity for pain relief and getting you back on your feet.      [place] has a Comprehensive Weight Management Program. This program includes counseling, education, non-surgical and  surgical approaches to weight loss. If you are interested in learning more either talk to you primary care provider or call [phone].

## 2020-01-27 NOTE — PROGRESS NOTES
"Podiatry / Foot and Ankle Surgery Progress Note    January 27, 2020    Subject: Patient was seen for follow up on left heel pain and MRI results. Notes she has been back in the boot which helps. Pain out of boot is 9/10. Pain in boot is 1-2/10.  Would like injection today.     Objective:  Vitals: /70   Ht 1.575 m (5' 2\")   Wt 74.8 kg (165 lb)   BMI 30.18 kg/m    BMI= Body mass index is 30.18 kg/m .    General:  Patient is alert and orientated.  NAD  Vascular:  DP and PT pulses are palpable.  No varicosities noted  CFT's < 3secs.  Skin temp is normal  Neuro:  Light and gross touch sensation intact to digits, dorsum, and plantar aspects of the feet.  Derm:  Skin is supple.  No rashes, lesions, or ulcerations noted.  Musculoskeletal:  No foot deformity noted.      Imaging: MRI left foot -   1. Plantar fasciitis involving the central cord plantar aponeurosis  with minimal partial thickness tear of the calcaneal insertional  fibers. Mild regional soft tissue tissue edema is noted.  2. Long segment Achilles tendinosis with low-grade partial-thickness  intrasubstance tear.  3. Tenosynovitis of the peroneus longus and brevis tendons.  4. Severely diminutive anterior talofibular ligament, presumably  sequelae of remote injury.  5. Irregular appearance of the intraosseous Lisfranc ligament,  questionably related to remote injury or possible normal variant.  Correlate with physical exam point tenderness if there is clinical  concern for injury.    Assessment:    Left foot pain  Plantar fasciitis, left  Tendonitis, Achilles, left    Plan:  Reviewed mri results. Again discussed plantar fasciitis. She would like injection today. Will also order physical therapy. Will kailyn with questions or concerns.     Procedure: After verbal consent, the patients max point of tenderness was marked out on the left plantar heel.  This area was prepped and draped using sterile technique.  An injection of 1cc of 1% lidocaine plain and 1 1/2 " cc of Kenalog-40 was injected into the max point of tenderness.  This was distributed in a fanning motion.  Patient tolerated the procedure and anesthesia well.      Kala Poon DPM, Podiatry/Foot and Ankle Surgery    Weight management plan: Patient was referred to their PCP to discuss a diet and exercise plan.

## 2020-02-06 ENCOUNTER — THERAPY VISIT (OUTPATIENT)
Dept: PHYSICAL THERAPY | Facility: CLINIC | Age: 55
End: 2020-02-06
Payer: COMMERCIAL

## 2020-02-06 DIAGNOSIS — M76.62 TENDONITIS, ACHILLES, LEFT: ICD-10-CM

## 2020-02-06 DIAGNOSIS — M72.2 PLANTAR FASCIITIS: ICD-10-CM

## 2020-02-06 DIAGNOSIS — M79.672 LEFT FOOT PAIN: ICD-10-CM

## 2020-02-06 DIAGNOSIS — M72.2 PLANTAR FASCIITIS, LEFT: ICD-10-CM

## 2020-02-06 PROCEDURE — 97033 APP MDLTY 1+IONTPHRSIS EA 15: CPT | Mod: GP | Performed by: PHYSICAL THERAPIST

## 2020-02-06 PROCEDURE — 97161 PT EVAL LOW COMPLEX 20 MIN: CPT | Mod: GP | Performed by: PHYSICAL THERAPIST

## 2020-02-06 PROCEDURE — 97110 THERAPEUTIC EXERCISES: CPT | Mod: GP | Performed by: PHYSICAL THERAPIST

## 2020-02-06 NOTE — PROGRESS NOTES
[facility] Initial Evaluation  Subjective:  The history is provided by the patient. No  was used.   Gracie Aranda being seen for Left foot pain.   Problem began 11/26/2019. Problem occurred: unknown  Pain score: 1/10 in a.m., 3/10 after work day. General health as reported by patient is good. Pertinent medical history includes:  Thyroid problems and depression.      Current medications:  Anti-depressants and thyroid medication.   Primary job tasks include:  Lifting/carrying, pushing/pulling and repetitive tasks.  Pain is described as aching and is intermittent. Pain is worse during the day. Since onset symptoms are rapidly improving. Special tests:  MRI. Previous treatment includes other.    Patient is . Restrictions include:  Working in normal job without restrictions.        Type of problem:  Left ankle and left foot   Condition occurred with:  Insidious onset. This is a chronic condition   Problem details: Patient reports the onset of left heel and achilles pain in October 2019 possibly due to weight gain. She first saw Dr. Poon on 11/26/19 and was put in a CAM boot which she stopped wearing due to hip pain and she had tried inserts which she stated made her pain worse and she did not use the prescribed NSAID.  She returned to  on 11/14/20 without improvement and and MRI was done on 1/29/20 which showed a minimal partial thickness tear of insertional fibers of the plantar fascia and soft tissue edema, patrial thickness intrasubstance achilles tear, tenosynovitis of peroneal longus and brevis tendon, minimization of anterior talofibular ligament and irregularity of Lisfranc ligament. She returned to Dr. Poon on 1/27/20 stating she was wearing the CAM boot some with decreased pain and she had a cortisone injection into L heel that day and was referred to PT. She reports 80-90% pain relief from the injection.  Chief complaints are of intermittent L heel pain  with standing first thing in the morning and with standing and walking and worse at the end of her work day. .   Patient reports pain:  Medial calcaneal tuberosity. Radiates to:  No radiation. Associated symptoms:  Loss of strength. Symptoms are exacerbated by descending stairs, standing and walking and relieved by ice, rest and other (cortisone injection and boot).                      Objective:  Standing Alignment:                Ankle/Foot:  Pes cavus L and pes cavus R    Gait:    Gait Type:  Antalgic   Assistive Devices:  None  Deviations:  Ankle:  Heel strike decr L and push off decr L  Non-Weight Bearing:          Ankle/Foot:  Forefoot varus L, forefoot varus R, rearfoot varus L and rearfoot varus R        Ankle/Foot Evaluation  ROM:    AROM:    Dorsiflexion:  Left:   10  Right:   10  Plantarflexion:  Left:  50    Right:  50  Inversion:  Left:  50     Right:  50  Eversion:  25     Right:  25      PROM:              Great Toe Extension:  Left:    90     Right: 90      Strength:                Anterior Tibialis:Left: 5-/5  Pain:  Right: 5/5  Pain:  Posterior Tibialis: Left: 4+/5  Pain:  Right: 5/5  Pain:  Peroneals: Left: 4+/5  Pain:  Right: 5/5  Pain:  Extensor Digitorum: Left: 5-/5  Pain:Right: 5/5  Pain:  Gastroc/Soleus:Left: 4+/5   Pain:  Right: 5/5  Pain:  LIGAMENT TESTING: not assessed              SPECIAL TESTS: not assessed    PALPATION:   Left ankle tenderness present at:  medial calcaneal  Left ankle tenderness not present at:   achilles tendon or plantar fascia        FUNCTIONAL TESTS:           Proprioception:  Stork Balance Test: Left: 5 seconds  Right: 10 seconds                                                     General     ROS    Assessment/Plan:    Patient is a 54 year old female with left side ankle complaints.    Patient has the following significant findings with corresponding treatment plan.                Diagnosis 1:  Left plantar fasciitis    Pain -  hot/cold therapy, electric  stimulation and education  Decreased ROM/flexibility - therapeutic exercise  Decreased strength - therapeutic exercise and therapeutic activities  Decreased proprioception - neuro re-education and therapeutic activities  Inflammation - cold therapy and iontophoresis  Impaired gait - gait training  Impaired muscle performance - neuro re-education  Decreased function - therapeutic activities    Therapy Evaluation Codes:   1) History comprised of:   Personal factors that impact the plan of care:      None.   2)  Examination of Body Systems comprised of:   Body structures and functions that impact the plan of care:      Ankle.   Activity limitations that impact the plan of care are:      Standing, Walking and Working.  3) Clinical presentation characteristics are:   Stable/Uncomplicated.  4) Decision-Making    Low complexity using standardized patient assessment instrument and/or measureable assessment of functional outcome.  Cumulative Therapy Evaluation is: Low complexity.    Previous and current functional limitations:  (See Goal Flow Sheet for this information)    Short term and Long term goals: (See Goal Flow Sheet for this information)     Communication ability:  Patient appears to be able to clearly communicate and understand verbal and written communication and follow directions correctly.  Treatment Explanation - The following has been discussed with the patient:   RX ordered/plan of care  Anticipated outcomes  Possible risks and side effects  This patient would benefit from PT intervention to resume normal activities.   Rehab potential is good.    Frequency:  2 X week, once daily  Duration:  for 4 weeks  Discharge Plan:  Achieve all LTG.  Independent in home treatment program.  Reach maximal therapeutic benefit.    Please refer to the daily flowsheet for treatment today, total treatment time and time spent performing 1:1 timed codes.

## 2020-02-10 ENCOUNTER — THERAPY VISIT (OUTPATIENT)
Dept: PHYSICAL THERAPY | Facility: CLINIC | Age: 55
End: 2020-02-10
Payer: COMMERCIAL

## 2020-02-10 DIAGNOSIS — M72.2 PLANTAR FASCIITIS, LEFT: ICD-10-CM

## 2020-02-10 PROCEDURE — 97110 THERAPEUTIC EXERCISES: CPT | Mod: GP | Performed by: PHYSICAL THERAPIST

## 2020-02-10 PROCEDURE — 97033 APP MDLTY 1+IONTPHRSIS EA 15: CPT | Mod: GP | Performed by: PHYSICAL THERAPIST

## 2020-02-14 ENCOUNTER — THERAPY VISIT (OUTPATIENT)
Dept: PHYSICAL THERAPY | Facility: CLINIC | Age: 55
End: 2020-02-14
Payer: COMMERCIAL

## 2020-02-14 DIAGNOSIS — M72.2 PLANTAR FASCIITIS, LEFT: ICD-10-CM

## 2020-02-14 PROCEDURE — 97110 THERAPEUTIC EXERCISES: CPT | Mod: GP | Performed by: PHYSICAL THERAPIST

## 2020-02-14 PROCEDURE — 97033 APP MDLTY 1+IONTPHRSIS EA 15: CPT | Mod: GP | Performed by: PHYSICAL THERAPIST

## 2020-02-14 NOTE — PROGRESS NOTES
SUBJECTIVE  Subjective changes as noted by pt:   Minimal L heel pain since 2/10/20.     Current pain level:  1/10   Changes in function:  Yes (See Goal flowsheet attached for changes in current functional level)     Adverse reaction to treatment or activity:  None    OBJECTIVE  Changes in objective findings:  Yes, L ankle active DF 16. L post tib grade 4/5, peroneal 4+/5. Fair to good proprioception.      ASSESSMENT  Gracie continues to require intervention to meet STG and LTG's: PT intervention is no longer required to meet STG/LTG.  Patient's symptoms are resolving.  Patient is ready to progress to more complex exercises.  Response to therapy has shown an improvement in  pain level, flexibility, strength, muscle control and function  Progress made towards STG/LTG?  Yes (See Goal flowsheet attached for updates on achievement of STG and LTG)    PLAN  Current treatment program is being advanced to more complex exercises.    PTA/ATC plan:  N/A    Please refer to the daily flowsheet for treatment today, total treatment time and time spent performing 1:1 timed codes.

## 2020-02-24 ENCOUNTER — THERAPY VISIT (OUTPATIENT)
Dept: PHYSICAL THERAPY | Facility: CLINIC | Age: 55
End: 2020-02-24
Payer: COMMERCIAL

## 2020-02-24 DIAGNOSIS — M72.2 PLANTAR FASCIITIS, LEFT: ICD-10-CM

## 2020-02-24 PROCEDURE — 97110 THERAPEUTIC EXERCISES: CPT | Mod: GP | Performed by: PHYSICAL THERAPIST

## 2020-02-24 NOTE — PROGRESS NOTES
DISCHARGE REPORT    Progress reporting period is from 2/6/20 to 2/24/20.       SUBJECTIVE  Subjective changes noted by patient:  No L heel pain. Feeling 100%. Able to wear inserts now without pain. Has not had iontophoresis for 10 days.    Current pain level is  0/10.     Previous pain level was  1-3/10   Changes in function:  Yes (See Goal flowsheet attached for changes in current functional level)  Adverse reaction to treatment or activity: None    OBJECTIVE  Changes noted in objective findings:  Yes,  L ankle active DF 18 deg before stretching. L posterior tib, peroneals and gastoc grade 5/5. Fair to good proprioception. Normal gait. Painfree with palpation.    ASSESSMENT/PLAN  Updated problem list and treatment plan: Diagnosis 1: Left plantar fasciitis      Decreased proprioception - home program  STG/LTGs have been met or progress has been made towards goals:  Yes (See Goal flow sheet completed today.)  Assessment of Progress: The patient's progress has slowed.  Self Management Plans:  Patient has been instructed in a home treatment program.    Gracie continues to require the following intervention to meet STG and LTG's:  PT intervention is no longer required to meet STG/LTG.    Recommendations:  This patient is ready to be discharged from therapy and continue their home treatment program.    Please refer to the daily flowsheet for treatment today, total treatment time and time spent performing 1:1 timed codes.

## 2020-03-07 ENCOUNTER — OFFICE VISIT (OUTPATIENT)
Dept: FAMILY MEDICINE | Facility: CLINIC | Age: 55
End: 2020-03-07
Payer: COMMERCIAL

## 2020-03-07 VITALS
BODY MASS INDEX: 29.81 KG/M2 | HEART RATE: 68 BPM | SYSTOLIC BLOOD PRESSURE: 91 MMHG | HEIGHT: 62 IN | RESPIRATION RATE: 16 BRPM | TEMPERATURE: 97.6 F | OXYGEN SATURATION: 97 % | WEIGHT: 162 LBS | DIASTOLIC BLOOD PRESSURE: 62 MMHG

## 2020-03-07 DIAGNOSIS — Z13.6 CARDIOVASCULAR SCREENING; LDL GOAL LESS THAN 160: ICD-10-CM

## 2020-03-07 DIAGNOSIS — Z12.11 SPECIAL SCREENING FOR MALIGNANT NEOPLASMS, COLON: ICD-10-CM

## 2020-03-07 DIAGNOSIS — Z00.00 ROUTINE GENERAL MEDICAL EXAMINATION AT A HEALTH CARE FACILITY: Primary | ICD-10-CM

## 2020-03-07 DIAGNOSIS — G43.009 MIGRAINE WITHOUT AURA AND WITHOUT STATUS MIGRAINOSUS, NOT INTRACTABLE: ICD-10-CM

## 2020-03-07 DIAGNOSIS — E03.9 HYPOTHYROIDISM, UNSPECIFIED TYPE: ICD-10-CM

## 2020-03-07 DIAGNOSIS — F33.0 MAJOR DEPRESSIVE DISORDER, RECURRENT EPISODE, MILD (H): ICD-10-CM

## 2020-03-07 LAB
ALBUMIN SERPL-MCNC: 3.5 G/DL (ref 3.4–5)
ALP SERPL-CCNC: 76 U/L (ref 40–150)
ALT SERPL W P-5'-P-CCNC: 42 U/L (ref 0–50)
ANION GAP SERPL CALCULATED.3IONS-SCNC: 3 MMOL/L (ref 3–14)
AST SERPL W P-5'-P-CCNC: 22 U/L (ref 0–45)
BASOPHILS # BLD AUTO: 0 10E9/L (ref 0–0.2)
BASOPHILS NFR BLD AUTO: 0.6 %
BILIRUB SERPL-MCNC: 0.3 MG/DL (ref 0.2–1.3)
BUN SERPL-MCNC: 25 MG/DL (ref 7–30)
CALCIUM SERPL-MCNC: 8.9 MG/DL (ref 8.5–10.1)
CHLORIDE SERPL-SCNC: 108 MMOL/L (ref 94–109)
CO2 SERPL-SCNC: 28 MMOL/L (ref 20–32)
CREAT SERPL-MCNC: 0.66 MG/DL (ref 0.52–1.04)
DIFFERENTIAL METHOD BLD: NORMAL
EOSINOPHIL # BLD AUTO: 0.1 10E9/L (ref 0–0.7)
EOSINOPHIL NFR BLD AUTO: 2.5 %
ERYTHROCYTE [DISTWIDTH] IN BLOOD BY AUTOMATED COUNT: 13.4 % (ref 10–15)
GFR SERPL CREATININE-BSD FRML MDRD: >90 ML/MIN/{1.73_M2}
GLUCOSE SERPL-MCNC: 81 MG/DL (ref 70–99)
HCT VFR BLD AUTO: 40.9 % (ref 35–47)
HGB BLD-MCNC: 13.9 G/DL (ref 11.7–15.7)
LDLC SERPL DIRECT ASSAY-MCNC: 81 MG/DL
LYMPHOCYTES # BLD AUTO: 1.3 10E9/L (ref 0.8–5.3)
LYMPHOCYTES NFR BLD AUTO: 24.4 %
MCH RBC QN AUTO: 31 PG (ref 26.5–33)
MCHC RBC AUTO-ENTMCNC: 34 G/DL (ref 31.5–36.5)
MCV RBC AUTO: 91 FL (ref 78–100)
MONOCYTES # BLD AUTO: 0.6 10E9/L (ref 0–1.3)
MONOCYTES NFR BLD AUTO: 11.2 %
NEUTROPHILS # BLD AUTO: 3.2 10E9/L (ref 1.6–8.3)
NEUTROPHILS NFR BLD AUTO: 61.3 %
PLATELET # BLD AUTO: 248 10E9/L (ref 150–450)
POTASSIUM SERPL-SCNC: 4.5 MMOL/L (ref 3.4–5.3)
PROT SERPL-MCNC: 7 G/DL (ref 6.8–8.8)
RBC # BLD AUTO: 4.49 10E12/L (ref 3.8–5.2)
SODIUM SERPL-SCNC: 139 MMOL/L (ref 133–144)
TSH SERPL DL<=0.005 MIU/L-ACNC: 3.51 MU/L (ref 0.4–4)
WBC # BLD AUTO: 5.2 10E9/L (ref 4–11)

## 2020-03-07 PROCEDURE — 90471 IMMUNIZATION ADMIN: CPT | Performed by: NURSE PRACTITIONER

## 2020-03-07 PROCEDURE — 80050 GENERAL HEALTH PANEL: CPT | Performed by: NURSE PRACTITIONER

## 2020-03-07 PROCEDURE — 99396 PREV VISIT EST AGE 40-64: CPT | Mod: 25 | Performed by: NURSE PRACTITIONER

## 2020-03-07 PROCEDURE — 83721 ASSAY OF BLOOD LIPOPROTEIN: CPT | Performed by: NURSE PRACTITIONER

## 2020-03-07 PROCEDURE — 36415 COLL VENOUS BLD VENIPUNCTURE: CPT | Performed by: NURSE PRACTITIONER

## 2020-03-07 PROCEDURE — 90682 RIV4 VACC RECOMBINANT DNA IM: CPT | Performed by: NURSE PRACTITIONER

## 2020-03-07 RX ORDER — FLUOXETINE 40 MG/1
40 CAPSULE ORAL DAILY
Qty: 90 CAPSULE | Refills: 3 | Status: SHIPPED | OUTPATIENT
Start: 2020-03-07 | End: 2021-11-30 | Stop reason: ALTCHOICE

## 2020-03-07 RX ORDER — SUMATRIPTAN 50 MG/1
50 TABLET, FILM COATED ORAL
Qty: 15 TABLET | Refills: 1 | Status: SHIPPED | OUTPATIENT
Start: 2020-03-07 | End: 2021-11-30

## 2020-03-07 RX ORDER — LEVOTHYROXINE SODIUM 50 UG/1
50 TABLET ORAL DAILY
Qty: 90 TABLET | Refills: 3 | Status: SHIPPED | OUTPATIENT
Start: 2020-03-07 | End: 2021-11-30

## 2020-03-07 SDOH — HEALTH STABILITY: MENTAL HEALTH
STRESS IS WHEN SOMEONE FEELS TENSE, NERVOUS, ANXIOUS, OR CAN'T SLEEP AT NIGHT BECAUSE THEIR MIND IS TROUBLED. HOW STRESSED ARE YOU?: NOT AT ALL

## 2020-03-07 SDOH — ECONOMIC STABILITY: TRANSPORTATION INSECURITY
IN THE PAST 12 MONTHS, HAS THE LACK OF TRANSPORTATION KEPT YOU FROM MEDICAL APPOINTMENTS OR FROM GETTING MEDICATIONS?: NO

## 2020-03-07 SDOH — SOCIAL STABILITY: SOCIAL NETWORK: HOW OFTEN DO YOU GET TOGETHER WITH FRIENDS OR RELATIVES?: ONCE A WEEK

## 2020-03-07 SDOH — SOCIAL STABILITY: SOCIAL NETWORK: ARE YOU MARRIED, WIDOWED, DIVORCED, SEPARATED, NEVER MARRIED, OR LIVING WITH A PARTNER?: NEVER MARRIED

## 2020-03-07 SDOH — HEALTH STABILITY: PHYSICAL HEALTH: ON AVERAGE, HOW MANY DAYS PER WEEK DO YOU ENGAGE IN MODERATE TO STRENUOUS EXERCISE (LIKE A BRISK WALK)?: 1 DAY

## 2020-03-07 SDOH — SOCIAL STABILITY: SOCIAL NETWORK
DO YOU BELONG TO ANY CLUBS OR ORGANIZATIONS SUCH AS CHURCH GROUPS UNIONS, FRATERNAL OR ATHLETIC GROUPS, OR SCHOOL GROUPS?: NO

## 2020-03-07 SDOH — HEALTH STABILITY: MENTAL HEALTH: HOW OFTEN DO YOU HAVE 6 OR MORE DRINKS ON ONE OCCASION?: NEVER

## 2020-03-07 SDOH — SOCIAL STABILITY: SOCIAL NETWORK: HOW OFTEN DO YOU ATTEND CHURCH OR RELIGIOUS SERVICES?: NEVER

## 2020-03-07 SDOH — HEALTH STABILITY: MENTAL HEALTH: HOW MANY STANDARD DRINKS CONTAINING ALCOHOL DO YOU HAVE ON A TYPICAL DAY?: PATIENT DECLINED

## 2020-03-07 SDOH — ECONOMIC STABILITY: FOOD INSECURITY: WITHIN THE PAST 12 MONTHS, THE FOOD YOU BOUGHT JUST DIDN'T LAST AND YOU DIDN'T HAVE MONEY TO GET MORE.: NEVER TRUE

## 2020-03-07 SDOH — HEALTH STABILITY: PHYSICAL HEALTH: ON AVERAGE, HOW MANY MINUTES DO YOU ENGAGE IN EXERCISE AT THIS LEVEL?: 30 MIN

## 2020-03-07 SDOH — SOCIAL STABILITY: SOCIAL NETWORK: HOW OFTEN DO YOU ATTENT MEETINGS OF THE CLUB OR ORGANIZATION YOU BELONG TO?: NEVER

## 2020-03-07 SDOH — HEALTH STABILITY: MENTAL HEALTH: HOW OFTEN DO YOU HAVE A DRINK CONTAINING ALCOHOL?: NEVER

## 2020-03-07 SDOH — ECONOMIC STABILITY: INCOME INSECURITY: HOW HARD IS IT FOR YOU TO PAY FOR THE VERY BASICS LIKE FOOD, HOUSING, MEDICAL CARE, AND HEATING?: NOT HARD AT ALL

## 2020-03-07 SDOH — ECONOMIC STABILITY: FOOD INSECURITY: WITHIN THE PAST 12 MONTHS, YOU WORRIED THAT YOUR FOOD WOULD RUN OUT BEFORE YOU GOT MONEY TO BUY MORE.: NEVER TRUE

## 2020-03-07 SDOH — SOCIAL STABILITY: SOCIAL NETWORK: IN A TYPICAL WEEK, HOW MANY TIMES DO YOU TALK ON THE PHONE WITH FAMILY, FRIENDS, OR NEIGHBORS?: ONCE A WEEK

## 2020-03-07 SDOH — ECONOMIC STABILITY: TRANSPORTATION INSECURITY
IN THE PAST 12 MONTHS, HAS LACK OF TRANSPORTATION KEPT YOU FROM MEETINGS, WORK, OR FROM GETTING THINGS NEEDED FOR DAILY LIVING?: NO

## 2020-03-07 ASSESSMENT — ENCOUNTER SYMPTOMS
DIARRHEA: 0
DIZZINESS: 0
ABDOMINAL PAIN: 0
CONSTIPATION: 0
HEMATOCHEZIA: 0
COUGH: 0
HEMATURIA: 0
CHILLS: 0

## 2020-03-07 ASSESSMENT — MIFFLIN-ST. JEOR: SCORE: 1293.08

## 2020-03-07 ASSESSMENT — ANXIETY QUESTIONNAIRES
5. BEING SO RESTLESS THAT IT IS HARD TO SIT STILL: NOT AT ALL
3. WORRYING TOO MUCH ABOUT DIFFERENT THINGS: NOT AT ALL
GAD7 TOTAL SCORE: 0
7. FEELING AFRAID AS IF SOMETHING AWFUL MIGHT HAPPEN: NOT AT ALL
4. TROUBLE RELAXING: NOT AT ALL
7. FEELING AFRAID AS IF SOMETHING AWFUL MIGHT HAPPEN: NOT AT ALL
6. BECOMING EASILY ANNOYED OR IRRITABLE: NOT AT ALL
2. NOT BEING ABLE TO STOP OR CONTROL WORRYING: NOT AT ALL
GAD7 TOTAL SCORE: 0
GAD7 TOTAL SCORE: 0
1. FEELING NERVOUS, ANXIOUS, OR ON EDGE: NOT AT ALL

## 2020-03-07 ASSESSMENT — PATIENT HEALTH QUESTIONNAIRE - PHQ9
SUM OF ALL RESPONSES TO PHQ QUESTIONS 1-9: 7
SUM OF ALL RESPONSES TO PHQ QUESTIONS 1-9: 7

## 2020-03-07 NOTE — PROGRESS NOTES
SUBJECTIVE:   CC: Gracie Aranda is an 54 year old woman who presents for preventive health visit.     Healthy Habits:     Getting at least 3 servings of Calcium per day:  Yes    Bi-annual eye exam:  Yes    Dental care twice a year:  Yes    Sleep apnea or symptoms of sleep apnea:  None    Diet:  Regular (no restrictions)    Frequency of exercise:  1 day/week    Duration of exercise:  15-30 minutes    Taking medications regularly:  Yes    Medication side effects:  None    PHQ-2 Total Score: 2    Additional concerns today:  No  1.  Breast cancer screening: last mammogram in 12/2019.  2.  Cervical cancer screening: last pap in 3/2018,NIL  3.  Fit test:  Due   4. Depression:  PhQ 9 score of 7, well controlled.     Today's PHQ-2 Score:   PHQ-2 ( 1999 [employer]) 3/7/2020   Q1: Little interest or pleasure in doing things 1   Q2: Feeling down, depressed or hopeless 1   PHQ-2 Score 2   Q1: Little interest or pleasure in doing things Several days   Q2: Feeling down, depressed or hopeless Several days   PHQ-2 Score 2       Abuse: Current or Past(Physical, Sexual or Emotional)- No  Do you feel safe in your environment? Yes        Social History     Tobacco Use     Smoking status: Never Smoker     Smokeless tobacco: Never Used   Substance Use Topics     Alcohol use: Yes     Frequency: Never     Drinks per session: Patient refused     Binge frequency: Never     Comment: rare     If you drink alcohol do you typically have >3 drinks per day or >7 drinks per week? No    Alcohol Use 3/7/2020   Prescreen: >3 drinks/day or >7 drinks/week? No   Prescreen: >3 drinks/day or >7 drinks/week? -     Reviewed orders with patient.  Reviewed health maintenance and updated orders accordingly - Yes  Lab work is in process  BP Readings from Last 3 Encounters:   03/07/20 91/62   01/27/20 120/70   01/14/20 104/64    Wt Readings from Last 3 Encounters:   03/07/20 73.5 kg (162 lb)   01/27/20 74.8 kg (165 lb)   01/14/20 74.8 kg (165 lb)                   Patient Active Problem List   Diagnosis     Hypothyroidism     Vitamin D deficiency     CARDIOVASCULAR SCREENING; LDL GOAL LESS THAN 160     Hypocalcemia     Menopausal flushing     Major depressive disorder, recurrent episode, mild (H)     Migraine without aura and without status migrainosus, not intractable     Past Surgical History:   Procedure Laterality Date     NO HISTORY OF SURGERY         Social History     Tobacco Use     Smoking status: Never Smoker     Smokeless tobacco: Never Used   Substance Use Topics     Alcohol use: Yes     Frequency: Never     Drinks per session: Patient refused     Binge frequency: Never     Comment: rare     Family History   Problem Relation Age of Onset     Cancer Mother         brain tumor     Depression Sister      Diabetes Maternal Aunt      Diabetes Maternal Aunt          Current Outpatient Medications   Medication Sig Dispense Refill     FLUoxetine (PROZAC) 40 MG capsule Take 1 capsule (40 mg) by mouth daily 90 capsule 3     levothyroxine (SYNTHROID/LEVOTHROID) 50 MCG tablet Take 1 tablet (50 mcg) by mouth daily 90 tablet 3     SUMAtriptan (IMITREX) 50 MG tablet Take 1 tablet (50 mg) by mouth at onset of headache for migraine Repeat in 2 hours if needed, do not take more than 100 mg per 24 hours 15 tablet 1     multivitamin, therapeutic with minerals (MULTI-VITAMIN) TABS Take 1 tablet by mouth daily         Mammogram Screening: Patient over age 50, mutual decision to screen reflected in health maintenance.    Pertinent mammograms are reviewed under the imaging tab.  History of abnormal Pap smear: NO - age 30- 65 PAP every 3 years recommended  PAP / HPV 3/28/2018 1/13/2015 10/3/2011   PAP NIL NIL NIL     Reviewed and updated as needed this visit by clinical staff  Tobacco  Allergies  Meds  Med Hx  Surg Hx  Fam Hx  Soc Hx        Reviewed and updated as needed this visit by Provider            Review of Systems   Constitutional: Negative for chills.   HENT: Negative for  "congestion.    Respiratory: Negative for cough.    Cardiovascular: Negative for chest pain.   Gastrointestinal: Negative for abdominal pain, constipation, diarrhea and hematochezia.   Genitourinary: Negative for hematuria.   Neurological: Negative for dizziness.          OBJECTIVE:   BP 91/62 (BP Location: Right arm, Patient Position: Chair, Cuff Size: Adult Large)   Pulse 68   Temp 97.6  F (36.4  C) (Oral)   Resp 16   Ht 1.575 m (5' 2\")   Wt 73.5 kg (162 lb)   SpO2 97%   Breastfeeding No   BMI 29.63 kg/m    Physical Exam  GENERAL: healthy, alert and no distress  EYES: Eyes grossly normal to inspection, PERRL and conjunctivae and sclerae normal  HENT: ear canals and TM's normal, nose and mouth without ulcers or lesions  NECK: no adenopathy, no asymmetry, masses, or scars and thyroid normal to palpation  RESP: lungs clear to auscultation - no rales, rhonchi or wheezes  BREAST: normal without masses, tenderness or nipple discharge and no palpable axillary masses or adenopathy  CV: regular rate and rhythm, normal S1 S2, no S3 or S4, no murmur, click or rub, no peripheral edema and peripheral pulses strong  ABDOMEN: soft, nontender, no hepatosplenomegaly, no masses and bowel sounds normal  MS: no gross musculoskeletal defects noted, no edema  SKIN: no suspicious lesions or rashes  NEURO: Normal strength and tone, mentation intact and speech normal  PSYCH: mentation appears normal, affect normal/bright        ASSESSMENT/PLAN:   Gracie was seen today for physical and recheck medication.    Diagnoses and all orders for this visit:    Routine general medical examination at a health care facility  -     VACCINE ADMINISTRATION, INITIAL  -     C RIV4 (FLUBLOK) VACCINE RECOMBINANT DNA PRSRV ANTIBIO FREE, IM [[id number]]  -     CBC with platelets differential  -     Comprehensive metabolic panel    Hypothyroidism, unspecified type  -     TSH WITH FREE T4 REFLEX  -     levothyroxine (SYNTHROID/LEVOTHROID) 50 MCG tablet; Take 1 " "tablet (50 mcg) by mouth daily    CARDIOVASCULAR SCREENING; LDL GOAL LESS THAN 160  -     LDL cholesterol direct    Special screening for malignant neoplasms, colon  -     Fecal colorectal cancer screen (FIT); Future    Major depressive disorder, recurrent episode, mild (H): PHQ 9 of 7, well controlled, refill.   -     FLUoxetine (PROZAC) 40 MG capsule; Take 1 capsule (40 mg) by mouth daily    Migraine without aura and without status migrainosus, not intractable  -     SUMAtriptan (IMITREX) 50 MG tablet; Take 1 tablet (50 mg) by mouth at onset of headache for migraine Repeat in 2 hours if needed, do not take more than 100 mg per 24 hours        COUNSELING:  Reviewed preventive health counseling, as reflected in patient instructions       Regular exercise       Healthy diet/nutrition    Estimated body mass index is 30.18 kg/m  as calculated from the following:    Height as of 1/27/20: 1.575 m (5' 2\").    Weight as of 1/27/20: 74.8 kg (165 lb).    Weight management plan: Discussed healthy diet and exercise guidelines     reports that she has never smoked. She has never used smokeless tobacco.      Counseling Resources:  ATP IV Guidelines  Pooled Cohorts Equation Calculator  Breast Cancer Risk Calculator  FRAX Risk Assessment  ICSI Preventive Guidelines  Dietary Guidelines for Americans, 2010  USDA's MyPlate  ASA Prophylaxis  Lung CA Screening  Follow up in 1 year, sooner as needed.   Susan Haase, APRN [facility]  Answers for HPI/ROS submitted by the patient on 3/7/2020   Annual Exam:  PHQ9 TOTAL SCORE: 7  SUSANNA 7 TOTAL SCORE: 0    "

## 2020-03-08 ASSESSMENT — ANXIETY QUESTIONNAIRES: GAD7 TOTAL SCORE: 0

## 2020-03-09 NOTE — RESULT ENCOUNTER NOTE
Julio C Bowman,  Your lab results are as below:  1)  TSH (thyroid level) 3.51 which is normal (range 0.4-4)  2) Your LDL (bad cholesterol) is normal at 81.   3)  Glucose is normal at 81 (normal fasting is <100).  4)  CBC (checks for anemia and infection) is normal.     If you have any questions do not hesitate to call the clinic to discuss the results with me further.     Sincerely,    Susan Haase, CNP

## 2020-11-16 ENCOUNTER — THERAPY VISIT (OUTPATIENT)
Dept: PHYSICAL THERAPY | Facility: CLINIC | Age: 55
End: 2020-11-16
Payer: COMMERCIAL

## 2020-11-16 DIAGNOSIS — M72.2 PLANTAR FASCIITIS: ICD-10-CM

## 2020-11-16 PROCEDURE — 97140 MANUAL THERAPY 1/> REGIONS: CPT | Mod: GP | Performed by: PHYSICAL THERAPIST

## 2020-11-16 PROCEDURE — 97161 PT EVAL LOW COMPLEX 20 MIN: CPT | Mod: GP | Performed by: PHYSICAL THERAPIST

## 2020-11-16 PROCEDURE — 97110 THERAPEUTIC EXERCISES: CPT | Mod: GP | Performed by: PHYSICAL THERAPIST

## 2020-11-16 PROCEDURE — 97035 APP MDLTY 1+ULTRASOUND EA 15: CPT | Mod: GP | Performed by: PHYSICAL THERAPIST

## 2020-11-16 NOTE — PROGRESS NOTES
[facility] Initial Evaluation  Subjective:  The history is provided by the patient. No  was used.   Patient Health History  Gracie Aranda being seen for planterfacitis right foot.     Problem began: 9/15/2020.   Problem occurred: doing eliptical machine   Pain is reported as 7/10 on pain scale.  General health as reported by patient is good.  Pertinent medical history includes: depression and thyroid problems.            Current medications:  Anti-depressants and thyroid medication.    Current occupation is .   Primary job tasks include:  Lifting/carrying, prolonged standing, pushing/pulling, repetitive tasks and other.   Other job/home tasks details: lots of walking.                Therapist Generated HPI Evaluation  Problem details: Patient was seen for treatment of left plantar fasciitis in February 2020 with good success.  Her feet were feeling good and on 9/15/20 she did 20 minutes on the eliptical after not doing it for at least 10 months with the onset of right heel pain. She has been icing, wearing her inserts and doing her exercises which were prescribed for her left foot without relief. Chief complaints are of nearly constant right heel pain which is at it's worse with standing out of bed and after 4-5 hours of standing at work. .         Type of problem:  Right foot.    This is a new condition.  Condition occurred with:  Repetition/overuse.  Where condition occurred: during recreation/sport.  Patient reports pain:  Medial calcaneal tuberosity.  Pain is described as stabbing and sharp and is constant.  Pain radiates to:  No radiation. Pain is worse in the A.M. and worse during the day.  Since onset symptoms are unchanged.  Symptoms are exacerbated by standing and weight bearing  and relieved by rest and ice.      Restrictions due to condition include:  Working in normal job without restrictions.                          Objective:  Standing Alignment:                 Ankle/Foot:  Pes planus R and pes planus L    Gait:    Gait Type:  Antalgic   Assistive Devices:  None  Deviations:  Ankle:  Heel strike decr R and push off decr R          Ankle/Foot Evaluation  ROM:    AROM:    Dorsiflexion:  Left:   20  Right:   20  Plantarflexion:  Left:  50    Right:  50  Inversion:  Left:  50     Right:  50  Eversion:  25     Right:  25      PROM:              Great Toe Extension:  Left:    90     Right: 50      Strength:                Anterior Tibialis:Left: 5/5  Pain:  Right: 5/5  Pain:  Posterior Tibialis: Left: 5/5  Pain:  Right: 5/5  Pain:  Peroneals: Left: 5/5  Pain:  Right: 5/5  Pain:  Extensor Digitorum: Left: 5/5  Pain:Right: 5/5  Pain:  Gastroc/Soleus:Left: 5/5   Pain:  Right: 5/5  Pain:  LIGAMENT TESTING: not assessed              SPECIAL TESTS: not assessed    PALPATION:     Right ankle tenderness present at:   plantar fascia (moderate fibrosis and pain with palpation) and medial calcaneal  EDEMA: normal          MOBILITY TESTING: normal              FUNCTIONAL TESTS:           Proprioception:  Stork Balance Test: Left: 20 seconds  Right: 10 seconds with pain                                                     General     ROS    Assessment/Plan:    Patient is a 55 year old female with right side ankle complaints.    Patient has the following significant findings with corresponding treatment plan.                Diagnosis 1:  Right plantar fasciitis  Pain -  hot/cold therapy, US and manual therapy  Decreased ROM/flexibility - manual therapy and therapeutic exercise  Impaired gait - gait training  Decreased function - therapeutic activities    Therapy Evaluation Codes:   1) History comprised of:   Personal factors that impact the plan of care:      None.     2) Examination of Body Systems comprised of:   Body structures and functions that impact the plan of care:      Ankle.   Activity limitations that impact the plan of care are:      Standing, Walking and  Working.  3) Clinical presentation characteristics are:   Stable/Uncomplicated.  4) Decision-Making    Low complexity using standardized patient assessment instrument and/or measureable assessment of functional outcome.  Cumulative Therapy Evaluation is: Low complexity.    Previous and current functional limitations:  (See Goal Flow Sheet for this information)    Short term and Long term goals: (See Goal Flow Sheet for this information)     Communication ability:  Patient appears to be able to clearly communicate and understand verbal and written communication and follow directions correctly.  Treatment Explanation - The following has been discussed with the patient:   RX ordered/plan of care  Anticipated outcomes  Possible risks and side effects  This patient would benefit from PT intervention to resume normal activities.   Rehab potential is good.    Frequency:  2 X week, once daily  Duration:  for 6 weeks  Discharge Plan:  Achieve all LTG.  Independent in home treatment program.  Reach maximal therapeutic benefit.    Please refer to the daily flowsheet for treatment today, total treatment time and time spent performing 1:1 timed codes.

## 2020-11-18 ENCOUNTER — THERAPY VISIT (OUTPATIENT)
Dept: PHYSICAL THERAPY | Facility: CLINIC | Age: 55
End: 2020-11-18
Payer: COMMERCIAL

## 2020-11-18 DIAGNOSIS — M72.2 PLANTAR FASCIITIS: ICD-10-CM

## 2020-11-18 PROCEDURE — 97110 THERAPEUTIC EXERCISES: CPT | Mod: GP | Performed by: PHYSICAL THERAPIST

## 2020-11-18 PROCEDURE — 97035 APP MDLTY 1+ULTRASOUND EA 15: CPT | Mod: GP | Performed by: PHYSICAL THERAPIST

## 2020-11-18 PROCEDURE — 97140 MANUAL THERAPY 1/> REGIONS: CPT | Mod: GP | Performed by: PHYSICAL THERAPIST

## 2020-11-25 ENCOUNTER — OFFICE VISIT (OUTPATIENT)
Dept: PODIATRY | Facility: CLINIC | Age: 55
End: 2020-11-25
Payer: COMMERCIAL

## 2020-11-25 VITALS
HEIGHT: 62 IN | SYSTOLIC BLOOD PRESSURE: 116 MMHG | BODY MASS INDEX: 29.44 KG/M2 | WEIGHT: 160 LBS | DIASTOLIC BLOOD PRESSURE: 60 MMHG

## 2020-11-25 DIAGNOSIS — M21.41 PES PLANUS OF BOTH FEET: ICD-10-CM

## 2020-11-25 DIAGNOSIS — M72.2 PLANTAR FASCIITIS, RIGHT: ICD-10-CM

## 2020-11-25 DIAGNOSIS — M79.671 RIGHT FOOT PAIN: Primary | ICD-10-CM

## 2020-11-25 DIAGNOSIS — M21.42 PES PLANUS OF BOTH FEET: ICD-10-CM

## 2020-11-25 PROCEDURE — 99213 OFFICE O/P EST LOW 20 MIN: CPT | Performed by: PODIATRIST

## 2020-11-25 RX ORDER — DEXAMETHASONE SODIUM PHOSPHATE 4 MG/ML
4 INJECTION, SOLUTION INTRA-ARTICULAR; INTRALESIONAL; INTRAMUSCULAR; INTRAVENOUS; SOFT TISSUE ONCE
Qty: 30 ML | Refills: 0 | Status: SHIPPED | OUTPATIENT
Start: 2020-11-25 | End: 2021-11-30

## 2020-11-25 ASSESSMENT — MIFFLIN-ST. JEOR: SCORE: 1279.01

## 2020-11-25 NOTE — PROGRESS NOTES
"Podiatry / Foot and Ankle Surgery Progress Note    November 25, 2020    Subject: Patient was seen for pain to the right foot.  She notes that it started back in October.  She went to physical therapy to start iontophoresis as that helped with her left foot when she had plantar fasciitis.  Notes pain is to the heel.  Started after she was working out 1 day.  Pain is 9 out of 10 at its worst.  She is a  and on her feet all day.  She is tried new inserts and new shoes.  They have not been helping.  She is wondering what can be done for the right foot pain.    Objective:  Vitals: /60   Ht 1.575 m (5' 2\")   Wt 72.6 kg (160 lb)   BMI 29.26 kg/m    BMI= Body mass index is 29.26 kg/m .    General:  Patient is alert and orientated.  NAD  Vascular:  DP and PT pulses are palpable.  No varicosities noted  CFT's < 3secs.  Skin temp is normal  Neuro:  Light and gross touch sensation intact to digits, dorsum, and plantar aspects of the feet.  Derm:  Skin is supple.  No rashes, lesions, or ulcerations noted.  Musculoskeletal: Decreased arch height.  Pain on palpation of the right plantar medial heel and lateral heel.    Assessment:    Right foot pain  Plantar fasciitis, right  Pes planus of both feet    Plan:  The potential causes and nature of plantar fasciitis were discussed with the patient.  We reviewed the natural history/prognosis of the condition and risks if left untreated.  These include chronic pain, other sites of pain due to gait changes, and potential plantar fascial rupture.      We discussed possible causes of the condition as it relates to the patients specific situation.      Conservative treatment options were reviewed:  appropriate shoes, avoidance of barefoot walking, inserts/orthoses, stretching, ice, massage, immobilization and NSAIDs.     We also reviewed the options of injection therapy and surgery.  However, it was made clear that surgery is only considered when conservative therapy fails. "  The risks and benefits of injection therapy, and surgery were discussed.     After thorough discussion and answering all questions, the patient elected to try physical therapy with iontophoresis as she notes it worked for her left foot.  I also recommend not going barefoot around the house.  She does have a boot and I recommend wearing that at home as she states she cannot wear that at work.  We talked about taking time off work and she will call if she would like a letter for that.  I also recommend icing/heat and topical pain cream to the area.  Talked about injection but she declined today.  If pain does not improve in 4 to 6 weeks recommend possibly the boot at all times, injection versus an MRI of the ankle.  All questions were answered to patient satisfaction and she will call further questions or concerns..       Kala Poon DPM, Podiatry/Foot and Ankle Surgery    Weight management plan: Patient was referred to their PCP to discuss a diet and exercise plan.  regarding elevated blood pressure.

## 2020-11-25 NOTE — LETTER
"    11/25/2020         RE: Gracie Aranda  [address] [facility] [address]        Dear Colleague,    Thank you for referring your patient, Gracie Aranda, to the [facility]. Please see a copy of my visit note below.    Podiatry / Foot and Ankle Surgery Progress Note    November 25, 2020    Subject: Patient was seen for pain to the right foot.  She notes that it started back in October.  She went to physical therapy to start iontophoresis as that helped with her left foot when she had plantar fasciitis.  Notes pain is to the heel.  Started after she was working out 1 day.  Pain is 9 out of 10 at its worst.  She is a  and on her feet all day.  She is tried new inserts and new shoes.  They have not been helping.  She is wondering what can be done for the right foot pain.    Objective:  Vitals: /60   Ht 1.575 m (5' 2\")   Wt 72.6 kg (160 lb)   BMI 29.26 kg/m    BMI= Body mass index is 29.26 kg/m .    General:  Patient is alert and orientated.  NAD  Vascular:  DP and PT pulses are palpable.  No varicosities noted  CFT's < 3secs.  Skin temp is normal  Neuro:  Light and gross touch sensation intact to digits, dorsum, and plantar aspects of the feet.  Derm:  Skin is supple.  No rashes, lesions, or ulcerations noted.  Musculoskeletal: Decreased arch height.  Pain on palpation of the right plantar medial heel and lateral heel.    Assessment:    Right foot pain  Plantar fasciitis, right  Pes planus of both feet    Plan:  The potential causes and nature of plantar fasciitis were discussed with the patient.  We reviewed the natural history/prognosis of the condition and risks if left untreated.  These include chronic pain, other sites of pain due to gait changes, and potential plantar fascial rupture.      We discussed possible causes of the condition as it relates to the patients specific situation.      Conservative treatment options were reviewed:  appropriate " shoes, avoidance of barefoot walking, inserts/orthoses, stretching, ice, massage, immobilization and NSAIDs.     We also reviewed the options of injection therapy and surgery.  However, it was made clear that surgery is only considered when conservative therapy fails.  The risks and benefits of injection therapy, and surgery were discussed.     After thorough discussion and answering all questions, the patient elected to try physical therapy with iontophoresis as she notes it worked for her left foot.  I also recommend not going barefoot around the house.  She does have a boot and I recommend wearing that at home as she states she cannot wear that at work.  We talked about taking time off work and she will call if she would like a letter for that.  I also recommend icing/heat and topical pain cream to the area.  Talked about injection but she declined today.  If pain does not improve in 4 to 6 weeks recommend possibly the boot at all times, injection versus an MRI of the ankle.  All questions were answered to patient satisfaction and she will call further questions or concerns..       Kala Poon DPM, Podiatry/Foot and Ankle Surgery    Weight management plan: Patient was referred to their PCP to discuss a diet and exercise plan.  regarding elevated blood pressure.        Again, thank you for allowing me to participate in the care of your patient.        Sincerely,        Kala Poon DPM, Podiatry/Foot and Ankle Surgery

## 2020-11-25 NOTE — PATIENT INSTRUCTIONS
Thank you for choosing [facility] Podiatry / Foot & Ankle Surgery!    [facility] SCHEDULE SURGERY: [phone]   [address] BILLING QUESTIONS: [phone]   [place] [address] AFTER HOURS: [phone]   PH: [phone] [employer] [place] LINE:[phone]   FAX: [phone] APPOINTMENTS: [phone]     PLANTAR FASCIITIS  Plantar fasciitis is often referred to as heel spurs or heel pain. Plantar fasciitis is a very common problem that affects people of all foot shapes, age, weight and activity level. Pain may be in the arch or on the weight-bearing surface of the heel. The pain may come on without injury or identifiable cause. Pain is generally present when first getting out of bed in the morning or up from a seated break.     CAUSES  The plantar fascia is a dense fibrous band of tissue that stretches across the bottom surface of the foot. The fascia helps support the foot muscles and arch. Plantar fasciitis is thought to be caused by mechanical strain or overload. Frequent walking without shoes or wearing unsupportive shoes is thought to cause structural overload and ultimately inflammation of the plantar fascia. Some people have heel spurs that can be seen on x-ray. The heel spur is actually a minor component of plantar fascitis and is largely ignored.       SELF TREATMENT   The easiest solution is to stop walking around your home without shoes. Plantar fasciitis is largely a shoe problem. Shoes are either not being worn often enough or your current shoes are inadequate for your weight, foot structure or activity level. The majority of shoes on the market today are not sufficient to resist development of plantar fasciitis or to promote healing. Assume that your current shoes are inadequate and will need to be replaced. Even high quality shoes wear out with 6 months to one year of frequent use. Weight loss is another option. Losing ten pounds in the next two months may be  enough to resolve the problem. Ice applied to the area of pain two to three times per day for ten minutes each session can be very helpful. This should continue until the problem resolves. Achilles tendon stretching is essential. Stretch multiple times daily to promote healing and to prevent recurrence in the future.     MEDICAL TREATMENT  Medical treatments often include custom arch supports, cortisone injections, physical therapy, splints to be worn in bed, prescription medications and surgery. The home treatments listed above will be necessary regardless of these advanced medical treatments. Surgery is rarely needed but is very helpful in selected cases.     PROGNOSIS  Plantar fasciitis can last from one day to a lifetime. Some people get intermittent fascitis that is very short-lived. Others suffer daily for years. Excessive body weight, frequent bare foot walking, long hours on the feet, inadequate shoes, predisposing foot structures and excessive activity such as running are all potential issues that lead to chronic and/or recurring plantar fascitis. Having plantar fasciitis means that you are forever prone to this problem and will require modification of some of the above factors. Most people seek treatment within one to four months. Healing usually requires a similar one to four month time frame. Healing time is relative to the amount of effort spent treating the problem.   Plantar fasciitis is highly recurrent. Risk factors often continue, including return to bare foot walking, inadequate shoes, excessive body weight, excessive activities, etc. Your life style and foot structure may predispose you to recurrent plantar fasciitis. A daily prevention regimen can be very helpful. Ongoing use of shoe inserts, careful attention to appropriate shoes, daily Achilles stretching, etc. may prevent recurrence. Prompt attention at the earliest warning signs of heel pain can resolve the problem in as short as a few days.      EXERCISES    Stair Exercise: Step on the stairs with the ball of your foot and hold your position for at least 15 seconds, then slowly step down with the heels of your foot. You can do this daily and as often as you want.   Picking the Towel: Sit comfortably and then pick the towel up with your toes. You can use any object other than a towel as long as the material can be soft and you can pick it up with your toes.  Rolling the Bottle: Use a small ball or frozen water bottle and then roll it around with your foot.   Flex the Toes: Sit comfortably and then flex your toes by pointing it towards the floor or towards your body. This will relax and flex your foot and exercise your plantar fascia, the calf, and the Achilles tendon. The inability of the foot to stretch often causes the bunching up of the plantar fascia area leading to the pain.  Calf/Achilles Stretching: Lay on you back and raise one foot, then point your toes towards the floor. See photo below:               Hold each stretch for 10 seconds. Stretch 10 times per set, three sets per day. Morning, afternoon and evening. If your heel pain is very severe in the morning, consider doing the first set of stretches before you get out of bed.    THERAPIES DISCUSSED:  1.  Supportive Shoes: minimizing barefoot ambulation helps to provide cushion, padding and support to the ligament that is inflamed. Socks, flip flops, flats and some slippers are not typically sufficient to provide support. Shoes should be worn even indoors  2.  Insert/Orthotics: ones with an arch support built in to them provide further stress relief for the ligament. See the information below on recommended inserts.  3. Icing: using a frozen water bottle or orange, and rolling it along the bottom of the arch/heel can help to alleviate discomfort, and can act as a tissue massage to the painful, inflamed ligament.  There is evidence that shows icing at least three times daily can be  beneficial  4.  Antiinflammatory (NSAID): Ibuprofen, Aleve, as well as Tylenol can be used to help decrease symptoms and improve pain levels. If you have high blood pressure, heart disease, stomach or kidney problems, use antiinflammatories sparingly. Tylenol should not be used if you have liver problems.   5. Activity Modifications: if there are certain things that you do, whether it's going barefoot or certain shoes/activities, you should try to minimize those activities as much as possible until your symptoms are sufficiently resolved. Certainly, some activities, such as running on the treadmill, are easier to take a break from versus others, such as work or chores at home. If there are certain activities that hurt your heel, and you keep doing those activities that hurt your heel, your heel will keep hurting.  **If these initial therapies are insufficient, we have our tier 2 therapies that can more aggressively work to improve your symptoms and get you back to the activities that you enjoy!    OVER THE COUNTER INSERTS    Most of these can be found at your local [facility], [employer], or online:    [email or web address]   Sofsole Fit Linkfluence   Power Step   Walk-Fit (Target)  *For heel pain* Arch Cradles  *For heel pain*       **A good high quality over the counter insert should cost around $40-$50      [facility] [facility]  [address] [place] [address]  [phone]   [address]  [phone] [place]  [address] [place] [address]  [phone]   [place] [address]  [phone]   [place]  [address]  [phone] [place] [address]  [phone]   [place]  [address]  [phone]   [place]  [address] [facility]  [phone] [place]  [address] [phone]

## 2020-12-02 ENCOUNTER — THERAPY VISIT (OUTPATIENT)
Dept: PHYSICAL THERAPY | Facility: CLINIC | Age: 55
End: 2020-12-02
Attending: PODIATRIST
Payer: COMMERCIAL

## 2020-12-02 DIAGNOSIS — M72.2 PLANTAR FASCIITIS: ICD-10-CM

## 2020-12-02 DIAGNOSIS — M72.2 PLANTAR FASCIITIS, RIGHT: ICD-10-CM

## 2020-12-02 DIAGNOSIS — M21.42 PES PLANUS OF BOTH FEET: ICD-10-CM

## 2020-12-02 DIAGNOSIS — M79.671 RIGHT FOOT PAIN: ICD-10-CM

## 2020-12-02 DIAGNOSIS — M21.41 PES PLANUS OF BOTH FEET: ICD-10-CM

## 2020-12-02 PROCEDURE — 97110 THERAPEUTIC EXERCISES: CPT | Mod: GP | Performed by: PHYSICAL THERAPIST

## 2020-12-02 PROCEDURE — 97035 APP MDLTY 1+ULTRASOUND EA 15: CPT | Mod: GP | Performed by: PHYSICAL THERAPIST

## 2020-12-02 PROCEDURE — 97140 MANUAL THERAPY 1/> REGIONS: CPT | Mod: GP | Performed by: PHYSICAL THERAPIST

## 2020-12-07 ENCOUNTER — THERAPY VISIT (OUTPATIENT)
Dept: PHYSICAL THERAPY | Facility: CLINIC | Age: 55
End: 2020-12-07
Payer: COMMERCIAL

## 2020-12-07 DIAGNOSIS — M72.2 PLANTAR FASCIITIS: ICD-10-CM

## 2020-12-07 PROCEDURE — 97033 APP MDLTY 1+IONTPHRSIS EA 15: CPT | Mod: GP | Performed by: PHYSICAL THERAPIST

## 2020-12-07 PROCEDURE — 97035 APP MDLTY 1+ULTRASOUND EA 15: CPT | Mod: GP | Performed by: PHYSICAL THERAPIST

## 2020-12-07 PROCEDURE — 97140 MANUAL THERAPY 1/> REGIONS: CPT | Mod: GP | Performed by: PHYSICAL THERAPIST

## 2020-12-10 ENCOUNTER — THERAPY VISIT (OUTPATIENT)
Dept: PHYSICAL THERAPY | Facility: CLINIC | Age: 55
End: 2020-12-10
Payer: COMMERCIAL

## 2020-12-10 DIAGNOSIS — M72.2 PLANTAR FASCIITIS: ICD-10-CM

## 2020-12-10 PROCEDURE — 97033 APP MDLTY 1+IONTPHRSIS EA 15: CPT | Mod: GP | Performed by: PHYSICAL THERAPIST

## 2020-12-10 PROCEDURE — 97035 APP MDLTY 1+ULTRASOUND EA 15: CPT | Mod: GP | Performed by: PHYSICAL THERAPIST

## 2020-12-10 PROCEDURE — 97140 MANUAL THERAPY 1/> REGIONS: CPT | Mod: GP | Performed by: PHYSICAL THERAPIST

## 2020-12-14 ENCOUNTER — THERAPY VISIT (OUTPATIENT)
Dept: PHYSICAL THERAPY | Facility: CLINIC | Age: 55
End: 2020-12-14
Payer: COMMERCIAL

## 2020-12-14 DIAGNOSIS — M72.2 PLANTAR FASCIITIS: ICD-10-CM

## 2020-12-14 PROCEDURE — 97140 MANUAL THERAPY 1/> REGIONS: CPT | Mod: GP | Performed by: PHYSICAL THERAPIST

## 2020-12-14 PROCEDURE — 97033 APP MDLTY 1+IONTPHRSIS EA 15: CPT | Mod: GP | Performed by: PHYSICAL THERAPIST

## 2020-12-14 PROCEDURE — 97035 APP MDLTY 1+ULTRASOUND EA 15: CPT | Mod: GP | Performed by: PHYSICAL THERAPIST

## 2021-01-04 PROBLEM — M72.2 PLANTAR FASCIITIS: Status: RESOLVED | Noted: 2020-02-06 | Resolved: 2021-01-04

## 2021-01-04 NOTE — PROGRESS NOTES
Subjective:  HPI  Physical Exam                    Objective:  System    Physical Exam    General     ROS    Assessment/Plan:    DISCHARGE REPORT    Progress reporting period is from initial to 1/4/2021.       SUBJECTIVE  Subjective changes noted by patient:  Gracie reports feeling less heel pain, walking in home/work with good shoes    Changes in function:  Yes (See Goal flowsheet attached for changes in current functional level)  Adverse reaction to treatment or activity: None    OBJECTIVE  Changes noted in objective findings:  Yes,   Objective: less medial heel tenderness, progressed today with DBL LE toe raises on stair w towel roll for increased 1st Ray DF     ASSESSMENT/PLAN  Updated problem list and treatment plan: Diagnosis 1:  Heel pain, Plantar Fascia pain  Pain -  home program  Decreased ROM/flexibility - manual therapy and therapeutic exercise  Decreased strength - therapeutic exercise and therapeutic activities  Decreased function - therapeutic activities  STG/LTGs have been met or progress has been made towards goals:  Yes (See Goal flow sheet completed today.)  Assessment of Progress: The patient's condition is improving.  Self Management Plans:  Patient has been instructed in a home treatment program.  Patient  has been instructed in self management of symptoms.  I have re-evaluated this patient and find that the nature, scope, duration and intensity of the therapy is appropriate for the medical condition of the patient.  Gracie continues to require the following intervention to meet STG and LTG's:  PT intervention is no longer required to meet STG/LTG.    Recommendations:  This patient is ready to be discharged from therapy and continue their home treatment program.  Pt did cancel her final 3 PT appointments.    Please refer to the daily flowsheet for treatment today, total treatment time and time spent performing 1:1 timed codes.

## 2021-01-15 ENCOUNTER — HEALTH MAINTENANCE LETTER (OUTPATIENT)
Age: 56
End: 2021-01-15

## 2021-01-18 DIAGNOSIS — Z12.31 VISIT FOR SCREENING MAMMOGRAM: ICD-10-CM

## 2021-01-18 PROCEDURE — 77067 SCR MAMMO BI INCL CAD: CPT | Mod: TC | Performed by: RADIOLOGY

## 2021-01-18 PROCEDURE — 77063 BREAST TOMOSYNTHESIS BI: CPT | Mod: TC | Performed by: RADIOLOGY

## 2021-05-09 ENCOUNTER — HEALTH MAINTENANCE LETTER (OUTPATIENT)
Age: 56
End: 2021-05-09

## 2021-09-17 ENCOUNTER — OFFICE VISIT (OUTPATIENT)
Dept: URGENT CARE | Facility: URGENT CARE | Age: 56
End: 2021-09-17
Payer: COMMERCIAL

## 2021-09-17 VITALS
OXYGEN SATURATION: 96 % | DIASTOLIC BLOOD PRESSURE: 72 MMHG | SYSTOLIC BLOOD PRESSURE: 124 MMHG | RESPIRATION RATE: 20 BRPM | TEMPERATURE: 98 F | HEART RATE: 82 BPM

## 2021-09-17 DIAGNOSIS — R05.9 COUGH: ICD-10-CM

## 2021-09-17 DIAGNOSIS — J02.9 SORE THROAT: Primary | ICD-10-CM

## 2021-09-17 LAB
BASOPHILS # BLD AUTO: 0 10E3/UL (ref 0–0.2)
BASOPHILS NFR BLD AUTO: 1 %
DEPRECATED S PYO AG THROAT QL EIA: NEGATIVE
EOSINOPHIL # BLD AUTO: 0.4 10E3/UL (ref 0–0.7)
EOSINOPHIL NFR BLD AUTO: 5 %
ERYTHROCYTE [DISTWIDTH] IN BLOOD BY AUTOMATED COUNT: 12.8 % (ref 10–15)
GROUP A STREP BY PCR: NOT DETECTED
HCT VFR BLD AUTO: 41.8 % (ref 35–47)
HGB BLD-MCNC: 14.3 G/DL (ref 11.7–15.7)
LYMPHOCYTES # BLD AUTO: 1.4 10E3/UL (ref 0.8–5.3)
LYMPHOCYTES NFR BLD AUTO: 20 %
MCH RBC QN AUTO: 30.4 PG (ref 26.5–33)
MCHC RBC AUTO-ENTMCNC: 34.2 G/DL (ref 31.5–36.5)
MCV RBC AUTO: 89 FL (ref 78–100)
MONOCYTES # BLD AUTO: 0.7 10E3/UL (ref 0–1.3)
MONOCYTES NFR BLD AUTO: 10 %
MONOCYTES NFR BLD AUTO: NEGATIVE %
NEUTROPHILS # BLD AUTO: 4.6 10E3/UL (ref 1.6–8.3)
NEUTROPHILS NFR BLD AUTO: 65 %
PLATELET # BLD AUTO: 303 10E3/UL (ref 150–450)
RBC # BLD AUTO: 4.7 10E6/UL (ref 3.8–5.2)
WBC # BLD AUTO: 7.1 10E3/UL (ref 4–11)

## 2021-09-17 PROCEDURE — 99213 OFFICE O/P EST LOW 20 MIN: CPT | Performed by: FAMILY MEDICINE

## 2021-09-17 PROCEDURE — 36415 COLL VENOUS BLD VENIPUNCTURE: CPT | Performed by: FAMILY MEDICINE

## 2021-09-17 PROCEDURE — 86308 HETEROPHILE ANTIBODY SCREEN: CPT | Performed by: FAMILY MEDICINE

## 2021-09-17 PROCEDURE — 87651 STREP A DNA AMP PROBE: CPT | Performed by: FAMILY MEDICINE

## 2021-09-17 PROCEDURE — 85025 COMPLETE CBC W/AUTO DIFF WBC: CPT | Performed by: FAMILY MEDICINE

## 2021-09-17 RX ORDER — CODEINE PHOSPHATE AND GUAIFENESIN 10; 100 MG/5ML; MG/5ML
2 SOLUTION ORAL EVERY 6 HOURS PRN
Qty: 118 ML | Refills: 0 | Status: SHIPPED | OUTPATIENT
Start: 2021-09-17 | End: 2021-11-30

## 2021-09-17 RX ORDER — DIPHENHYDRAMINE HYDROCHLORIDE AND LIDOCAINE HYDROCHLORIDE AND ALUMINUM HYDROXIDE AND MAGNESIUM HYDRO
10 KIT EVERY 6 HOURS PRN
Qty: 237 ML | Refills: 0 | Status: SHIPPED | OUTPATIENT
Start: 2021-09-17 | End: 2021-11-30

## 2021-09-17 NOTE — PROGRESS NOTES
SUBJECTIVE:   Gracie Aranda is a 55 year old female presenting with a chief complaint of headache, sinus, cough, sore throat.  Feels horrible in the morning.  Seems to be better after day progressed.  No fever  Onset of symptoms was 1 week(s) ago.  Course of illness is same.    Severity moderate  Current and Associated symptoms: cough, headache, sore throat  Treatment measures tried include Fluids, Rest and mucinex, cough drops, claritin.  Predisposing factors include None.    COVID screen obtained 9/13 - rapid test negative and PCR negative  Completed Moderna COVID vaccinations in March    No close ill contacts  Has not had mono    Past Medical History:   Diagnosis Date     Depressive disorder, not elsewhere classified      Current Outpatient Medications   Medication Sig Dispense Refill     guaiFENesin-codeine (ROBITUSSIN AC) 100-10 MG/5ML solution Take 10 mLs by mouth every 6 hours as needed for cough 118 mL 0     magic mouthwash suspension, diphenhydrAMINE, lidocaine, aluminum-magnesium & simethicone, (FIRST-MOUTHWASH BLM) compounding kit Swish and swallow 10 mLs in mouth every 6 hours as needed for sore throat 237 mL 0     multivitamin, therapeutic with minerals (MULTI-VITAMIN) TABS Take 1 tablet by mouth daily       SUMAtriptan (IMITREX) 50 MG tablet Take 1 tablet (50 mg) by mouth at onset of headache for migraine Repeat in 2 hours if needed, do not take more than 100 mg per 24 hours 15 tablet 1     dexamethasone (DECADRON) 4 MG/ML injection Apply 1 mL (4 mg) topically once for 1 dose For physical therapy, iontophoresis 30 mL 0     FLUoxetine (PROZAC) 40 MG capsule Take 1 capsule (40 mg) by mouth daily (Patient not taking: Reported on 9/17/2021) 90 capsule 3     levothyroxine (SYNTHROID/LEVOTHROID) 50 MCG tablet Take 1 tablet (50 mcg) by mouth daily (Patient not taking: Reported on 9/17/2021) 90 tablet 3     Social History     Tobacco Use     Smoking status: Never Smoker     Smokeless tobacco: Never Used    Substance Use Topics     Alcohol use: Yes     Comment: rare       ROS:  Review of systems negative except as stated above.    OBJECTIVE:  /72   Pulse 82   Temp 98  F (36.7  C) (Oral)   Resp 20   SpO2 96%   GENERAL APPEARANCE: healthy, alert and no distress  EYES: EOMI,  PERRL, conjunctiva clear  HENT: ear canals and TM's normal.  Nose and mouth without ulcers, erythema or lesions.  Pharynx pink, no exudates  RESP: lungs clear to auscultation - no rales, rhonchi or wheezes  CV: regular rates and rhythm, normal S1 S2, no murmur noted  PSYCH: mentation appears normal and affect normal/bright    Results for orders placed or performed in visit on 09/17/21   Mononucleosis screen     Status: Normal   Result Value Ref Range    Mononucleosis Screen Negative Negative   CBC with platelets and differential     Status: None   Result Value Ref Range    WBC Count 7.1 4.0 - 11.0 10e3/uL    RBC Count 4.70 3.80 - 5.20 10e6/uL    Hemoglobin 14.3 11.7 - 15.7 g/dL    Hematocrit 41.8 35.0 - 47.0 %    MCV 89 78 - 100 fL    MCH 30.4 26.5 - 33.0 pg    MCHC 34.2 31.5 - 36.5 g/dL    RDW 12.8 10.0 - 15.0 %    Platelet Count 303 150 - 450 10e3/uL    % Neutrophils 65 %    % Lymphocytes 20 %    % Monocytes 10 %    % Eosinophils 5 %    % Basophils 1 %    Absolute Neutrophils 4.6 1.6 - 8.3 10e3/uL    Absolute Lymphocytes 1.4 0.8 - 5.3 10e3/uL    Absolute Monocytes 0.7 0.0 - 1.3 10e3/uL    Absolute Eosinophils 0.4 0.0 - 0.7 10e3/uL    Absolute Basophils 0.0 0.0 - 0.2 10e3/uL   Streptococcus A Rapid Screen w/Reflex to PCR - Clinic Collect     Status: Normal    Specimen: Throat; Swab   Result Value Ref Range    Group A Strep antigen Negative Negative   CBC with platelets and differential     Status: None    Narrative    The following orders were created for panel order CBC with platelets and differential.  Procedure                               Abnormality         Status                     ---------                                -----------         ------                     CBC with platelets and d...[[id number]]                      Final result                 Please view results for these tests on the individual orders.         ASSESSMENT/PLAN:  (J02.9) Sore throat  (primary encounter diagnosis)  Plan: Mononucleosis screen, CBC with platelets and         differential, Streptococcus A Rapid Screen         w/Reflex to PCR - Clinic Collect, magic         mouthwash suspension, diphenhydrAMINE,         lidocaine, aluminum-magnesium & simethicone,         (FIRST-MOUTHWASH BLM) compounding kit, Group A         Streptococcus PCR Throat Swab            (R05) Cough  Plan: guaiFENesin-codeine (ROBITUSSIN AC) 100-10         MG/5ML solution            Reassurance given, patient is non-toxic and no acute distress, reviewed that may still be viral etiology.  Discussed symptomatic treatment with tylenol, ibuprofen, plenty of fluids and rest.  Will follow up on throat culture culture and treat if positive for strep.  RX magic mouthwash given to help with sore throat.  RX bella AC given to help with cough.  Reviewed that may still be COVID infection and recommend to complete 10 day quarantine (already 1 week in).    Follow up with primary provider if no improvement of symptoms in 1 week    Dave Gar MD,  September 17, 2021 1:25 PM

## 2021-09-17 NOTE — PATIENT INSTRUCTIONS
Okay to take ibuprofen 200 mg - 4 tablets (800 mg) every 8 hours as needed.  Okay to take tylenol 500 mg - 2 tablets (1000 mg) every 6-8 hours as needed, do not exceed 3000 mg in 24 hours.    We will notify you if the throat culture is positive for strep - you will need an antibiotic.    Take magic mouthwash to help with sore throat.  Take robitussin with codeine to help with cough, best at bedtime due to drowsiness    Quarantine for total of 10 days from symptom onset for possible COVID.        Patient Education     Viral Upper Respiratory Illness (Adult)    You have a viral upper respiratory illness (URI), which is another term for the common cold. This illness is contagious during the first few days. It is spread through the air by coughing and sneezing. It may also be spread by direct contact (touching the sick person and then touching your own eyes, nose, or mouth). Frequent handwashing will decrease risk of spread. Most viral illnesses go away within 7 to 10 days with rest and simple home remedies. Sometimes the illness may last for several weeks. Antibiotics will not kill a virus, and they are generally not prescribed for this condition.  Home care    If symptoms are severe, rest at home for the first 2 to 3 days. When you resume activity, don't let yourself get too tired.    Don't smoke. If you need help stopping, talk with your healthcare provider.    Avoid being exposed to cigarette smoke (yours or others ).    You may use acetaminophen or ibuprofen to control pain and fever, unless another medicine was prescribed. If you have chronic liver or kidney disease, have ever had a stomach ulcer or gastrointestinal bleeding, or are taking blood-thinning medicines, talk with your healthcare provider before using these medicines. Aspirin should never be given to anyone under 18 years of age who is ill with a viral infection or fever. It may cause severe liver or brain damage.    Your appetite may be poor, so a  light diet is fine. Stay well hydrated by drinking 6 to 8 glasses of fluids per day (water, soft drinks, juices, tea, or soup). Extra fluids will help loosen secretions in the nose and lungs.    Over-the-counter cold medicines will not shorten the length of time you re sick, but they may be helpful for the following symptoms: cough, sore throat, and nasal and sinus congestion. If you take prescription medicines, ask your healthcare provider or pharmacist which over-the-counter medicines are safe to use. (Note: Don't use decongestants if you have high blood pressure.)  Follow-up care  Follow up with your healthcare provider, or as advised.  When to seek medical advice  Call your healthcare provider right away if any of these occur:    Cough with lots of colored sputum (mucus)    Severe headache; face, neck, or ear pain    Difficulty swallowing due to throat pain    Fever of 100.4 F (38 C) or higher, or as directed by your healthcare provider  Call 911  Call 911 if any of these occur:    Chest pain, shortness of breath, wheezing, or difficulty breathing    Coughing up blood    Very severe pain with swallowing, especially if it goes along with a muffled voice   [employer] last reviewed this educational content on 6/1/2018 2000-2021 [employer]. All rights reserved. This information is not intended as a substitute for professional medical care. Always follow your healthcare professional's instructions.           Patient Education     Understanding COVID-19 (Coronavirus Disease 2019)  COVID-19 is an illness of the lungs. It causes fever, coughing and trouble breathing. The illness is caused by a new virus in the coronavirus family called SARS-CoV-2.  First seen in late 2019, this virus has spread to many cities and countries around the world. Scientists are working to understand it better.      To help prevent spreading the infection, wash your hands often, or use an alcohol-based hand .   For the  latest information, visit the CDC website at www.cdc.gov/coronavirus/2019-ncov. Or call 293-WYE-NMDU (283-994-3735).   How does COVID-19 spread?  The virus seems to spread and infect people fairly easily. It may spread by:    Breathing in droplets of fluid that someone coughs or sneezes into the air.    Touching your eyes, nose or mouth after touching an infected surface. (The virus can live on handles, objects, and other surfaces.)  What are the symptoms of COVID-19?  Some people have no symptoms or only mild symptoms. Symptoms can vary from person to person. As experts learn more about COVID-19, new symptoms are being reported.  Symptoms may appear 2 to 14 days after contact with the virus. They include:    Fever or chills    Coughing    Trouble breathing or feeling short of breath    Sore throat    Stuffy or runny nose    Headache and body aches    Fatigue (feeling very tired)    Nausea or vomiting (feeling sick to your stomach or throwing up)    Diarrhea (loose, watery poop)    Abdominal (belly) pain    Loss of smell or taste  You can check your symptoms with the CDC s Coronavirus Self-.   What are possible problems from COVID-19?  In many cases, this virus can cause infection (pneumonia) in both lungs. This can make a person very ill, and it can even cause death.  Your chances of severe illness are higher if:    You re an older adult    You have a serious health issue, such as heart or lung disease, diabetes or kidney disease    You have a health problem (or take a medicine) that suppresses the immune system  Rarely, some children develop a severe problem called MIS-C. This is similar to [place] disease, which causes blood vessels and body organs to be inflamed. We don t yet know if MIS-C happens only in children, or if adults are also at risk. We also don t know if it's related to COVID-19--many children with MIS-C test positive for the virus, but not all.  Experts continue to study MIS-C. The [facility] has  asked hospitals to report any person under 21 who is ill enough to be in the hospital and has all of the following:    A fever over 100.4 F (38.0 C) for more than 24 hours and either a positive COVID-19 test or exposure to the virus in the last 4 weeks    Inflammation in at least 2 organs such as the heart, lungs or kidneys, with lab tests that show inflammation    No other diagnoses besides COVID-19 explain the child's symptoms  How is COVID-19 diagnosed?  Your care team will ask about your symptoms, recent travel and contact with sick people.  Not everyone will be tested for the virus. Tests that check for current COVID-19 infection include:    Viral (PCR) tests. Viral tests are very accurate. Testers may use a cotton swab to take a sample of cells from your nose and throat, or they may take a sample of your saliva (spit). Some tests can be done at home, while others are done at testing sites. Depending on the test, results might come back in about 30 minutes, or they may take several days (because they must be sent to a lab). Home test kits are now available in some areas, often with prescription. If you use a home kit, follow the instructions closely.    Antigen tests. Testers may use a cotton swab to take a sample of cells from your nose and throat. Depending on the test, some results are back within an hour. This test is good at finding COVID-19, but it sometimes shows that someone has the virus when they don t (false positive), especially in places where few people have the virus. Antigen tests are more likely to miss a COVID-19 infection than a viral test. If your antigen test is negative (shows you don t have the virus), but you have symptoms of COVID-19, your care team may order a viral test.  You may have other tests as well, such as:    Antibody (blood test).  This test may show if you ve had the virus in the past--it won t tell us if you have it right now. (It takes a few weeks for the antibodies to  show up in your blood). If you ve had the virus, you may have immunity (protection from the virus) in the future. We don t know yet how long you would be immune. Antibody tests aren t always accurate.    Sputum test (we may collect mucus that you have coughed up from your lungs).    Chest X-ray or CT scan.  Note about immunity  We don t yet know if people can catch COVID-19 more than once. If a person who has fully recovered is re-tested within 3 months, the test may still show low levels of the virus in their body. (In other words, the test may show that they still have COVID-19, even though they aren t spreading it to others.)  This doesn't mean they can't catch COVID-19 again. They may be protected from the virus for a time, but we don t know how long immunity might last.  How is COVID-19 treated?  The FDA has approved a vaccine to prevent COVID-19 in people 16 years and older who are not pregnant or breastfeeding. It's not yet available to the entire public. The first people to get the vaccine are healthcare staff and those living in long-term care facilities. The vaccine is given as a shot (injection) in the arm muscle. Two doses are needed. The second dose is given several weeks after the first.  For those who have COVID-19, the most proven treatment is to support your body while it fights the virus.    Getting extra rest.    Drink extra fluids (6 to 8 glasses of liquids each day), unless a doctor has told you not to. Ask your care team which fluids are best for you. Avoid fluids that contain caffeine or alcohol.    Take over-the-counter (OTC) pain medicine to reduce pain and fever. Your care team will tell you which medicine to use.  If you are very sick, you may need to stay in the hospital. Your care may include:    IV (intravenous) fluids. We may give you fluids through a needle in your vein to keep hydrated..    Oxygen. To make sure your body gets enough oxygen, we may give you an oxygen mask or a  breathing machine (ventilator).    Prone positioning. We may turn you onto your stomach. This will increase the oxygen in your lungs.    Remdesivir. We may give you a medicine through a vein (IV medicine) called remdesivir. It works by stopping the spread of the virus in the body. It's FDA-approved for people being treated in the hospital. This medicine is for those 12 years and older who weigh more than about 88 pounds (40 kgs). In certain cases, it may also be used for people younger than 12 years or who weigh less than about 88 pounds (40 kgs).  Experts are researching experimental treatments as well. These include:    COVID-19 convalescent plasma. Those who have recovered from COVID-19 may be asked to donate plasma. The plasma may contain antibodies to help fight the virus in others. Experts don't know if the plasma will work well as a treatment. Research continues, and the FDA has approved it for emergency use in certain people with serious or life-threatening COVID-19. For more information, talk to your care team.    Bamlanivimab. The FDA recently approved this treatment (monoclonal antibody therapy) for emergency use for certain people who have COVID-19 but are not in the hospital. It's not widely available and is still being investigated. It s approved for people 12 years and older who weigh about 88 pounds (40 kgs) and are at high risk for severe COVID-19 and a hospital stay. This includes people who are 65 years or older and people with certain chronic conditions.  Are you at risk for COVID-19?  Some studies suggest that people without symptoms may spread COVID-19.  You re at risk for getting COVID-19 if:    You live in (or recently traveled to) an area with a COVID-19 outbreak.    You had close contact with someone who has or may have COVID-19. Close contact means being within 6 feet for a total of 15 minutes or more. This includes several short contacts that add up to at least 15 minutes over a 24-hour  period.  Date last modified: 1/15/2021  [employer] last reviewed this educational content on 1/1/2020  This information has been modified by your health care provider with permission from the publisher.    7714-4370 [employer]. [address] [place] [address], [place] [address]. All rights reserved. This information is not intended as a substitute for professional medical care. Always follow your healthcare professional's instructions.

## 2021-10-24 ENCOUNTER — HEALTH MAINTENANCE LETTER (OUTPATIENT)
Age: 56
End: 2021-10-24

## 2021-11-29 SDOH — ECONOMIC STABILITY: INCOME INSECURITY: HOW HARD IS IT FOR YOU TO PAY FOR THE VERY BASICS LIKE FOOD, HOUSING, MEDICAL CARE, AND HEATING?: NOT HARD AT ALL

## 2021-11-29 SDOH — ECONOMIC STABILITY: INCOME INSECURITY: IN THE LAST 12 MONTHS, WAS THERE A TIME WHEN YOU WERE NOT ABLE TO PAY THE MORTGAGE OR RENT ON TIME?: NO

## 2021-11-29 SDOH — ECONOMIC STABILITY: FOOD INSECURITY: WITHIN THE PAST 12 MONTHS, YOU WORRIED THAT YOUR FOOD WOULD RUN OUT BEFORE YOU GOT MONEY TO BUY MORE.: NEVER TRUE

## 2021-11-29 SDOH — HEALTH STABILITY: PHYSICAL HEALTH: ON AVERAGE, HOW MANY MINUTES DO YOU ENGAGE IN EXERCISE AT THIS LEVEL?: 0 MIN

## 2021-11-29 SDOH — ECONOMIC STABILITY: FOOD INSECURITY: WITHIN THE PAST 12 MONTHS, THE FOOD YOU BOUGHT JUST DIDN'T LAST AND YOU DIDN'T HAVE MONEY TO GET MORE.: NEVER TRUE

## 2021-11-29 SDOH — HEALTH STABILITY: PHYSICAL HEALTH: ON AVERAGE, HOW MANY DAYS PER WEEK DO YOU ENGAGE IN MODERATE TO STRENUOUS EXERCISE (LIKE A BRISK WALK)?: 0 DAYS

## 2021-11-29 ASSESSMENT — ENCOUNTER SYMPTOMS
SORE THROAT: 0
HEADACHES: 0
NAUSEA: 0
FREQUENCY: 0
PARESTHESIAS: 0
HEMATURIA: 0
ARTHRALGIAS: 0
COUGH: 0
NERVOUS/ANXIOUS: 0
PALPITATIONS: 0
JOINT SWELLING: 0
EYE PAIN: 0
HEARTBURN: 0
HEMATOCHEZIA: 0
ABDOMINAL PAIN: 0
MYALGIAS: 0
BREAST MASS: 0
DIARRHEA: 0
DYSURIA: 0
FEVER: 0
CHILLS: 0
WEAKNESS: 0
DIZZINESS: 0
SHORTNESS OF BREATH: 0
CONSTIPATION: 0

## 2021-11-29 ASSESSMENT — SOCIAL DETERMINANTS OF HEALTH (SDOH)
DO YOU BELONG TO ANY CLUBS OR ORGANIZATIONS SUCH AS CHURCH GROUPS UNIONS, FRATERNAL OR ATHLETIC GROUPS, OR SCHOOL GROUPS?: NO
IN A TYPICAL WEEK, HOW MANY TIMES DO YOU TALK ON THE PHONE WITH FAMILY, FRIENDS, OR NEIGHBORS?: NEVER
HOW OFTEN DO YOU GET TOGETHER WITH FRIENDS OR RELATIVES?: NEVER
HOW OFTEN DO YOU ATTEND CHURCH OR RELIGIOUS SERVICES?: NEVER

## 2021-11-29 ASSESSMENT — LIFESTYLE VARIABLES
HOW OFTEN DO YOU HAVE A DRINK CONTAINING ALCOHOL: NEVER
HOW OFTEN DO YOU HAVE SIX OR MORE DRINKS ON ONE OCCASION: NEVER

## 2021-11-30 ENCOUNTER — OFFICE VISIT (OUTPATIENT)
Dept: FAMILY MEDICINE | Facility: CLINIC | Age: 56
End: 2021-11-30
Payer: COMMERCIAL

## 2021-11-30 VITALS
OXYGEN SATURATION: 97 % | SYSTOLIC BLOOD PRESSURE: 106 MMHG | BODY MASS INDEX: 29.33 KG/M2 | HEART RATE: 73 BPM | DIASTOLIC BLOOD PRESSURE: 70 MMHG | WEIGHT: 159.4 LBS | HEIGHT: 62 IN | TEMPERATURE: 97.9 F

## 2021-11-30 DIAGNOSIS — Z00.00 ROUTINE GENERAL MEDICAL EXAMINATION AT A HEALTH CARE FACILITY: Primary | ICD-10-CM

## 2021-11-30 DIAGNOSIS — F33.0 MAJOR DEPRESSIVE DISORDER, RECURRENT EPISODE, MILD (H): ICD-10-CM

## 2021-11-30 DIAGNOSIS — Z12.4 CERVICAL CANCER SCREENING: ICD-10-CM

## 2021-11-30 DIAGNOSIS — E03.9 HYPOTHYROIDISM, UNSPECIFIED TYPE: ICD-10-CM

## 2021-11-30 DIAGNOSIS — Z12.11 COLON CANCER SCREENING: ICD-10-CM

## 2021-11-30 DIAGNOSIS — Z23 ENCOUNTER FOR ADMINISTRATION OF COVID-19 VACCINE: ICD-10-CM

## 2021-11-30 PROBLEM — N95.1 MENOPAUSAL FLUSHING: Status: RESOLVED | Noted: 2017-03-10 | Resolved: 2021-11-30

## 2021-11-30 LAB
BASOPHILS # BLD AUTO: 0 10E3/UL (ref 0–0.2)
BASOPHILS NFR BLD AUTO: 1 %
EOSINOPHIL # BLD AUTO: 0.3 10E3/UL (ref 0–0.7)
EOSINOPHIL NFR BLD AUTO: 5 %
ERYTHROCYTE [DISTWIDTH] IN BLOOD BY AUTOMATED COUNT: 13.1 % (ref 10–15)
HCT VFR BLD AUTO: 40.7 % (ref 35–47)
HGB BLD-MCNC: 13.5 G/DL (ref 11.7–15.7)
LYMPHOCYTES # BLD AUTO: 1.5 10E3/UL (ref 0.8–5.3)
LYMPHOCYTES NFR BLD AUTO: 24 %
MCH RBC QN AUTO: 30.1 PG (ref 26.5–33)
MCHC RBC AUTO-ENTMCNC: 33.2 G/DL (ref 31.5–36.5)
MCV RBC AUTO: 91 FL (ref 78–100)
MONOCYTES # BLD AUTO: 0.7 10E3/UL (ref 0–1.3)
MONOCYTES NFR BLD AUTO: 12 %
NEUTROPHILS # BLD AUTO: 3.5 10E3/UL (ref 1.6–8.3)
NEUTROPHILS NFR BLD AUTO: 58 %
PLATELET # BLD AUTO: 235 10E3/UL (ref 150–450)
RBC # BLD AUTO: 4.48 10E6/UL (ref 3.8–5.2)
WBC # BLD AUTO: 6 10E3/UL (ref 4–11)

## 2021-11-30 PROCEDURE — 99396 PREV VISIT EST AGE 40-64: CPT | Mod: 25 | Performed by: NURSE PRACTITIONER

## 2021-11-30 PROCEDURE — 87624 HPV HI-RISK TYP POOLED RSLT: CPT | Performed by: NURSE PRACTITIONER

## 2021-11-30 PROCEDURE — G0145 SCR C/V CYTO,THINLAYER,RESCR: HCPCS | Performed by: NURSE PRACTITIONER

## 2021-11-30 PROCEDURE — 83721 ASSAY OF BLOOD LIPOPROTEIN: CPT | Performed by: NURSE PRACTITIONER

## 2021-11-30 PROCEDURE — 0064A COVID-19,PF,MODERNA (18+ YRS BOOSTER .25ML): CPT | Performed by: NURSE PRACTITIONER

## 2021-11-30 PROCEDURE — 82274 ASSAY TEST FOR BLOOD FECAL: CPT | Performed by: NURSE PRACTITIONER

## 2021-11-30 PROCEDURE — 99213 OFFICE O/P EST LOW 20 MIN: CPT | Mod: 25 | Performed by: NURSE PRACTITIONER

## 2021-11-30 PROCEDURE — 80050 GENERAL HEALTH PANEL: CPT | Performed by: NURSE PRACTITIONER

## 2021-11-30 PROCEDURE — 91306 COVID-19,PF,MODERNA (18+ YRS BOOSTER .25ML): CPT | Performed by: NURSE PRACTITIONER

## 2021-11-30 PROCEDURE — 36415 COLL VENOUS BLD VENIPUNCTURE: CPT | Performed by: NURSE PRACTITIONER

## 2021-11-30 ASSESSMENT — ENCOUNTER SYMPTOMS
COUGH: 0
JOINT SWELLING: 0
NERVOUS/ANXIOUS: 0
PARESTHESIAS: 0
CHILLS: 0
HEADACHES: 0
HEARTBURN: 0
PALPITATIONS: 0
DIZZINESS: 0
DIARRHEA: 0
DYSURIA: 0
FEVER: 0
SHORTNESS OF BREATH: 0
CONSTIPATION: 0
ABDOMINAL PAIN: 0
MYALGIAS: 0
EYE PAIN: 0
SORE THROAT: 0
WEAKNESS: 0
ARTHRALGIAS: 0
HEMATURIA: 0
BREAST MASS: 0
FREQUENCY: 0
NAUSEA: 0
HEMATOCHEZIA: 0

## 2021-11-30 ASSESSMENT — ANXIETY QUESTIONNAIRES
GAD7 TOTAL SCORE: 6
3. WORRYING TOO MUCH ABOUT DIFFERENT THINGS: SEVERAL DAYS
GAD7 TOTAL SCORE: 6
1. FEELING NERVOUS, ANXIOUS, OR ON EDGE: SEVERAL DAYS
2. NOT BEING ABLE TO STOP OR CONTROL WORRYING: SEVERAL DAYS
6. BECOMING EASILY ANNOYED OR IRRITABLE: MORE THAN HALF THE DAYS
4. TROUBLE RELAXING: NOT AT ALL
8. IF YOU CHECKED OFF ANY PROBLEMS, HOW DIFFICULT HAVE THESE MADE IT FOR YOU TO DO YOUR WORK, TAKE CARE OF THINGS AT HOME, OR GET ALONG WITH OTHER PEOPLE?: SOMEWHAT DIFFICULT
7. FEELING AFRAID AS IF SOMETHING AWFUL MIGHT HAPPEN: SEVERAL DAYS
5. BEING SO RESTLESS THAT IT IS HARD TO SIT STILL: NOT AT ALL
GAD7 TOTAL SCORE: 6
7. FEELING AFRAID AS IF SOMETHING AWFUL MIGHT HAPPEN: SEVERAL DAYS

## 2021-11-30 ASSESSMENT — MIFFLIN-ST. JEOR: SCORE: 1271.28

## 2021-11-30 ASSESSMENT — PATIENT HEALTH QUESTIONNAIRE - PHQ9
10. IF YOU CHECKED OFF ANY PROBLEMS, HOW DIFFICULT HAVE THESE PROBLEMS MADE IT FOR YOU TO DO YOUR WORK, TAKE CARE OF THINGS AT HOME, OR GET ALONG WITH OTHER PEOPLE: SOMEWHAT DIFFICULT
SUM OF ALL RESPONSES TO PHQ QUESTIONS 1-9: 5
SUM OF ALL RESPONSES TO PHQ QUESTIONS 1-9: 5

## 2021-11-30 NOTE — PROGRESS NOTES
Future Appointments   Date Time Provider Department Center   11/30/2021 10:50 AM Haase, Susan Rachele, APRN CNP [facility]     Appointment Notes for this encounter:   Pap    Health Maintenance Due   Topic Date Due     ANNUAL REVIEW OF HM ORDERS  Never done     ADVANCE CARE PLANNING  Never done     ZOSTER IMMUNIZATION (1 of 2) Never done     COLORECTAL CANCER SCREENING  05/24/2019     PHQ-9  09/07/2020     PREVENTIVE CARE VISIT  03/07/2021     TSH W/FREE T4 REFLEX  03/07/2021     PAP  03/28/2021     INFLUENZA VACCINE (1) 09/01/2021     Health Maintenance addressed:  Pap Smear, Immunizations and Flu Vaccine    Pap Smear Possibly complete today - per provider review and Immunizations wants booster and not flu    MyChart Status:  Active and Using     SUBJECTIVE:   CC: Gracie Aranda is an 56 year old woman who presents for preventive health visit.       Patient has been advised of split billing requirements and indicates understanding: Yes  Healthy Habits:     Getting at least 3 servings of Calcium per day:  NO    Bi-annual eye exam:  Yes    Dental care twice a year:  Yes    Sleep apnea or symptoms of sleep apnea:  None    Diet:  Regular (no restrictions)    Frequency of exercise:  None    Taking medications regularly:  Yes    Medication side effects:  None    PHQ-2 Total Score: 2    Additional concerns today:  No  Cervical cancer screening: last pap 3/2018, NIL. Denies vaginal bleeding, some vaginal itching  Colon cancer screening:  Last Fit 5/2018  Breast cancer screening:  Last mammogram 1/2021  Depression:  PHQ 9 of 5, SUSANNA 7 of 6, feeling irritable, has not been on fluoxetine for about 1 year, would like to start back on the medication. Denies thoughts of harming self or others.  Hypothyroidism:  Has been off of levothyroxine for about 1 year, denies increase in fatigue, constipation, weight gain.                Today's PHQ-2 Score:   PHQ-2 ( 1999 Pfizer) 11/29/2021   Q1: Little interest or pleasure in doing things 1    Q2: Feeling down, depressed or hopeless 1   PHQ-2 Score 2   PHQ-2 Total Score (12-17 Years)- Positive if 3 or more points; Administer PHQ-A if positive -   Q1: Little interest or pleasure in doing things Several days   Q2: Feeling down, depressed or hopeless Several days   PHQ-2 Score 2   Answers for HPI/ROS submitted by the patient on 11/30/2021  If you checked off any problems, how difficult have these problems made it for you to do your work, take care of things at home, or get along with other people?: Somewhat difficult  PHQ9 TOTAL SCORE: 5  SUSANNA 7 TOTAL SCORE: 6        Abuse: Current or Past (Physical, Sexual or Emotional) - No  Do you feel safe in your environment? Yes    Have you ever done Advance Care Planning? (For example, a Health Directive, POLST, or a discussion with a medical provider or your loved ones about your wishes): No, advance care planning information given to patient to review.  Patient plans to discuss their wishes with loved ones or provider.      Social History     Tobacco Use     Smoking status: Never Smoker     Smokeless tobacco: Never Used   Substance Use Topics     Alcohol use: Yes     Comment: rare         Alcohol Use 11/29/2021   Prescreen: >3 drinks/day or >7 drinks/week? No   Prescreen: >3 drinks/day or >7 drinks/week? -       Reviewed orders with patient.  Reviewed health maintenance and updated orders accordingly -   Lab work is in process  BP Readings from Last 3 Encounters:   11/30/21 106/70   09/17/21 124/72   11/25/20 116/60    Wt Readings from Last 3 Encounters:   11/30/21 72.3 kg (159 lb 6.4 oz)   11/25/20 72.6 kg (160 lb)   03/07/20 73.5 kg (162 lb)                  Patient Active Problem List   Diagnosis     Hypothyroidism     Vitamin D deficiency     CARDIOVASCULAR SCREENING; LDL GOAL LESS THAN 160     Major depressive disorder, recurrent episode, mild (H)     Migraine without aura and without status migrainosus, not intractable     Past Surgical History:   Procedure  Laterality Date     NO HISTORY OF SURGERY         Social History     Tobacco Use     Smoking status: Never Smoker     Smokeless tobacco: Never Used   Substance Use Topics     Alcohol use: Yes     Comment: rare     Family History   Problem Relation Age of Onset     Cancer Mother         brain tumor     Depression Sister      Diabetes Maternal Aunt      Diabetes Maternal Aunt          Current Outpatient Medications   Medication Sig Dispense Refill     FLUoxetine (PROZAC) 20 MG capsule Take 20 mg for 4 weeks, after 4 weeks increase to 40 mg. 180 capsule 1     levothyroxine (SYNTHROID/LEVOTHROID) 50 MCG tablet Take 1 tablet (50 mcg) by mouth daily (Patient not taking: Reported on 9/17/2021) 90 tablet 3     multivitamin, therapeutic with minerals (MULTI-VITAMIN) TABS Take 1 tablet by mouth daily         Breast Cancer Screening:    Breast CA Risk Assessment (FHS-7) 11/29/2021   Do you have a family history of breast, colon, or ovarian cancer? No / Unknown         Mammogram Screening: Recommended mammography every 1-2 years with patient discussion and risk factor consideration  Pertinent mammograms are reviewed under the imaging tab.    History of abnormal Pap smear: NO - age 30- 65 PAP every 3 years recommended  PAP / HPV 3/28/2018 1/13/2015 10/3/2011   PAP (Historical) NIL NIL NIL     Reviewed and updated as needed this visit by clinical staff  Tobacco  Allergies      Fam Hx  Soc Hx       Reviewed and updated as needed this visit by Provider                   Review of Systems   Constitutional: Negative for chills and fever.   HENT: Negative for congestion, ear pain, hearing loss and sore throat.    Eyes: Negative for pain and visual disturbance.   Respiratory: Negative for cough and shortness of breath.    Cardiovascular: Negative for chest pain, palpitations and peripheral edema.   Gastrointestinal: Negative for abdominal pain, constipation, diarrhea, heartburn, hematochezia and nausea.   Breasts:  Negative for  "tenderness, breast mass and discharge.   Genitourinary: Negative for dysuria, frequency, genital sores, hematuria, pelvic pain, urgency, vaginal bleeding and vaginal discharge.   Musculoskeletal: Negative for arthralgias, joint swelling and myalgias.   Skin: Negative for rash.   Neurological: Negative for dizziness, weakness, headaches and paresthesias.   Psychiatric/Behavioral: Negative for mood changes. The patient is not nervous/anxious.           OBJECTIVE:   /70 (BP Location: Right arm, Patient Position: Sitting, Cuff Size: Adult Regular)   Pulse 73   Temp 97.9  F (36.6  C) (Oral)   Ht 1.575 m (5' 2\")   Wt 72.3 kg (159 lb 6.4 oz)   SpO2 97%   BMI 29.15 kg/m    Physical Exam  GENERAL: healthy, alert and no distress  EYES: Eyes grossly normal to inspection,   HENT: ear canals and TM's normal, nose and mouth without ulcers or lesions  NECK: no adenopathy, no asymmetry, masses, or scars and thyroid normal to palpation  RESP: lungs clear to auscultation - no rales, rhonchi or wheezes  BREAST: normal without masses, tenderness or nipple discharge and no palpable axillary masses or adenopathy  CV: regular rate and rhythm, normal S1 S2, no S3 or S4, no murmur, click or rub,   ABDOMEN: soft, nontender, no hepatosplenomegaly, no masses and bowel sounds normal   (female): normal female external genitalia, normal urethral meatus, vaginal mucosa pink, moist, well rugated, and normal cervix/adnexa/uterus without masses or discharge  MS: no gross musculoskeletal defects noted, no edema  SKIN: no suspicious lesions or rashes  NEURO: Normal strength and tone, mentation intact and speech normal  PSYCH: mentation appears normal, affect normal/bright        ASSESSMENT/PLAN:   Gracie was seen today for physical.    Diagnoses and all orders for this visit:    Routine general medical examination at a health care facility  -     CBC with Platelets & Differential; Future  -     Comprehensive metabolic panel; Future  -     " "LDL cholesterol direct; Future  -     CBC with Platelets & Differential  -     Comprehensive metabolic panel  -     LDL cholesterol direct    Encounter for administration of COVID-19 vaccine  -     COVID-19,DEVAUGHN,MODERNA (18+ YRS BOOSTER .25ML)    Hypothyroidism, unspecified type: will obtain TSH level prior to starting back on levothyroxine, last took medication about 1 year ago.  -     TSH WITH FREE T4 REFLEX; Future  -     TSH WITH FREE T4 REFLEX    Cervical cancer screening  -     Pap Screen with HPV - recommended age 30 - 65 years    Colon cancer screening  -     Fecal colorectal cancer screen (FIT); Future  -     Fecal colorectal cancer screen (FIT)    Major depressive disorder, recurrent episode, mild (H):   PHQ 9 of 5, SUSANNA 7 of 6,Denies thoughts of harming self or others. Will start back on fluoxetine, has been on this previously and done well.  Start at 20 mg for 4 weeks and increase back to 40 mg dose.   -     FLUoxetine (PROZAC) 20 MG capsule; Take 20 mg for 4 weeks, after 4 weeks increase to 40 mg.    Other orders  -     REVIEW OF HEALTH MAINTENANCE PROTOCOL ORDERS        Patient has been advised of split billing requirements and indicates understanding:   COUNSELING:  Reviewed preventive health counseling, as reflected in patient instructions       Regular exercise       Healthy diet/nutrition    Estimated body mass index is 29.15 kg/m  as calculated from the following:    Height as of this encounter: 1.575 m (5' 2\").    Weight as of this encounter: 72.3 kg (159 lb 6.4 oz).    Weight management plan: Discussed healthy diet and exercise guidelines    She reports that she has never smoked. She has never used smokeless tobacco.      Counseling Resources:  ATP IV Guidelines  Pooled Cohorts Equation Calculator  Breast Cancer Risk Calculator  BRCA-Related Cancer Risk Assessment: FHS-7 Tool  FRAX Risk Assessment  ICSI Preventive Guidelines  Dietary Guidelines for Americans, 2010  USDA's MyPlate  ASA " Prophylaxis  Lung CA Screening    Susan Haase, APRN CNP  [facility]

## 2021-12-01 LAB
ALBUMIN SERPL-MCNC: 3.3 G/DL (ref 3.4–5)
ALP SERPL-CCNC: 80 U/L (ref 40–150)
ALT SERPL W P-5'-P-CCNC: 29 U/L (ref 0–50)
ANION GAP SERPL CALCULATED.3IONS-SCNC: 4 MMOL/L (ref 3–14)
AST SERPL W P-5'-P-CCNC: 21 U/L (ref 0–45)
BILIRUB SERPL-MCNC: 0.2 MG/DL (ref 0.2–1.3)
BUN SERPL-MCNC: 16 MG/DL (ref 7–30)
CALCIUM SERPL-MCNC: 8.4 MG/DL (ref 8.5–10.1)
CHLORIDE BLD-SCNC: 110 MMOL/L (ref 94–109)
CO2 SERPL-SCNC: 27 MMOL/L (ref 20–32)
CREAT SERPL-MCNC: 0.72 MG/DL (ref 0.52–1.04)
GFR SERPL CREATININE-BSD FRML MDRD: >90 ML/MIN/1.73M2
GLUCOSE BLD-MCNC: 97 MG/DL (ref 70–99)
LDLC SERPL CALC-MCNC: 86 MG/DL
POTASSIUM BLD-SCNC: 4.2 MMOL/L (ref 3.4–5.3)
PROT SERPL-MCNC: 6.7 G/DL (ref 6.8–8.8)
SODIUM SERPL-SCNC: 141 MMOL/L (ref 133–144)
TSH SERPL DL<=0.005 MIU/L-ACNC: 2.97 MU/L (ref 0.4–4)

## 2021-12-01 ASSESSMENT — PATIENT HEALTH QUESTIONNAIRE - PHQ9: SUM OF ALL RESPONSES TO PHQ QUESTIONS 1-9: 5

## 2021-12-01 ASSESSMENT — ANXIETY QUESTIONNAIRES: GAD7 TOTAL SCORE: 6

## 2021-12-02 LAB
BKR LAB AP GYN ADEQUACY: NORMAL
BKR LAB AP GYN INTERPRETATION: NORMAL
BKR LAB AP HPV REFLEX: NORMAL
BKR LAB AP LMP: NORMAL
BKR LAB AP PREVIOUS ABNORMAL: NORMAL
PATH REPORT.COMMENTS IMP SPEC: NORMAL
PATH REPORT.COMMENTS IMP SPEC: NORMAL
PATH REPORT.RELEVANT HX SPEC: NORMAL

## 2021-12-07 LAB
HUMAN PAPILLOMA VIRUS 16 DNA: NEGATIVE
HUMAN PAPILLOMA VIRUS 18 DNA: NEGATIVE
HUMAN PAPILLOMA VIRUS FINAL DIAGNOSIS: NORMAL
HUMAN PAPILLOMA VIRUS OTHER HR: NEGATIVE

## 2021-12-25 LAB — HEMOCCULT STL QL IA: NEGATIVE

## 2022-06-17 DIAGNOSIS — F33.0 MAJOR DEPRESSIVE DISORDER, RECURRENT EPISODE, MILD (H): ICD-10-CM

## 2022-06-17 NOTE — TELEPHONE ENCOUNTER
Prescription approved per [facility] Refill Protocol.    Has appointment scheduled 6/29/2022.  Plans to establish with new Primary Care Provider.    Nadja Wright RN

## 2022-10-15 ENCOUNTER — HEALTH MAINTENANCE LETTER (OUTPATIENT)
Age: 57
End: 2022-10-15

## 2023-03-26 ENCOUNTER — HEALTH MAINTENANCE LETTER (OUTPATIENT)
Age: 58
End: 2023-03-26

## 2023-06-06 ASSESSMENT — ANXIETY QUESTIONNAIRES
4. TROUBLE RELAXING: SEVERAL DAYS
1. FEELING NERVOUS, ANXIOUS, OR ON EDGE: NOT AT ALL
GAD7 TOTAL SCORE: 9
3. WORRYING TOO MUCH ABOUT DIFFERENT THINGS: MORE THAN HALF THE DAYS
7. FEELING AFRAID AS IF SOMETHING AWFUL MIGHT HAPPEN: SEVERAL DAYS
5. BEING SO RESTLESS THAT IT IS HARD TO SIT STILL: SEVERAL DAYS
IF YOU CHECKED OFF ANY PROBLEMS ON THIS QUESTIONNAIRE, HOW DIFFICULT HAVE THESE PROBLEMS MADE IT FOR YOU TO DO YOUR WORK, TAKE CARE OF THINGS AT HOME, OR GET ALONG WITH OTHER PEOPLE: SOMEWHAT DIFFICULT
2. NOT BEING ABLE TO STOP OR CONTROL WORRYING: MORE THAN HALF THE DAYS
6. BECOMING EASILY ANNOYED OR IRRITABLE: MORE THAN HALF THE DAYS

## 2023-06-12 ASSESSMENT — PATIENT HEALTH QUESTIONNAIRE - PHQ9: SUM OF ALL RESPONSES TO PHQ QUESTIONS 1-9: 15

## 2023-07-20 ASSESSMENT — ANXIETY QUESTIONNAIRES
2. NOT BEING ABLE TO STOP OR CONTROL WORRYING: MORE THAN HALF THE DAYS
GAD7 TOTAL SCORE: 9
4. TROUBLE RELAXING: SEVERAL DAYS
6. BECOMING EASILY ANNOYED OR IRRITABLE: MORE THAN HALF THE DAYS
5. BEING SO RESTLESS THAT IT IS HARD TO SIT STILL: SEVERAL DAYS
GAD7 TOTAL SCORE: 9
3. WORRYING TOO MUCH ABOUT DIFFERENT THINGS: MORE THAN HALF THE DAYS
7. FEELING AFRAID AS IF SOMETHING AWFUL MIGHT HAPPEN: SEVERAL DAYS
1. FEELING NERVOUS, ANXIOUS, OR ON EDGE: NOT AT ALL
IF YOU CHECKED OFF ANY PROBLEMS ON THIS QUESTIONNAIRE, HOW DIFFICULT HAVE THESE PROBLEMS MADE IT FOR YOU TO DO YOUR WORK, TAKE CARE OF THINGS AT HOME, OR GET ALONG WITH OTHER PEOPLE: SOMEWHAT DIFFICULT

## 2023-07-20 ASSESSMENT — PATIENT HEALTH QUESTIONNAIRE - PHQ9
SUM OF ALL RESPONSES TO PHQ QUESTIONS 1-9: 15
SUM OF ALL RESPONSES TO PHQ QUESTIONS 1-9: 15
10. IF YOU CHECKED OFF ANY PROBLEMS, HOW DIFFICULT HAVE THESE PROBLEMS MADE IT FOR YOU TO DO YOUR WORK, TAKE CARE OF THINGS AT HOME, OR GET ALONG WITH OTHER PEOPLE: SOMEWHAT DIFFICULT

## 2023-07-21 ENCOUNTER — OFFICE VISIT (OUTPATIENT)
Dept: INTERNAL MEDICINE | Facility: CLINIC | Age: 58
End: 2023-07-21
Payer: COMMERCIAL

## 2023-07-21 VITALS
RESPIRATION RATE: 20 BRPM | TEMPERATURE: 97.3 F | WEIGHT: 160.5 LBS | DIASTOLIC BLOOD PRESSURE: 68 MMHG | SYSTOLIC BLOOD PRESSURE: 100 MMHG | HEART RATE: 96 BPM | OXYGEN SATURATION: 96 % | BODY MASS INDEX: 29.53 KG/M2 | HEIGHT: 62 IN

## 2023-07-21 DIAGNOSIS — E03.9 HYPOTHYROIDISM, UNSPECIFIED TYPE: ICD-10-CM

## 2023-07-21 DIAGNOSIS — F33.1 MODERATE EPISODE OF RECURRENT MAJOR DEPRESSIVE DISORDER (H): Primary | ICD-10-CM

## 2023-07-21 LAB — TSH SERPL DL<=0.005 MIU/L-ACNC: 2.84 UIU/ML (ref 0.3–4.2)

## 2023-07-21 PROCEDURE — 96127 BRIEF EMOTIONAL/BEHAV ASSMT: CPT | Performed by: INTERNAL MEDICINE

## 2023-07-21 PROCEDURE — 99214 OFFICE O/P EST MOD 30 MIN: CPT | Performed by: INTERNAL MEDICINE

## 2023-07-21 PROCEDURE — 36415 COLL VENOUS BLD VENIPUNCTURE: CPT | Performed by: INTERNAL MEDICINE

## 2023-07-21 PROCEDURE — 84443 ASSAY THYROID STIM HORMONE: CPT | Performed by: INTERNAL MEDICINE

## 2023-07-21 RX ORDER — VENLAFAXINE HYDROCHLORIDE 37.5 MG/1
CAPSULE, EXTENDED RELEASE ORAL
Qty: 74 CAPSULE | Refills: 0 | Status: SHIPPED | OUTPATIENT
Start: 2023-07-21 | End: 2023-09-01

## 2023-07-21 ASSESSMENT — PAIN SCALES - GENERAL: PAINLEVEL: NO PAIN (0)

## 2023-07-21 ASSESSMENT — ANXIETY QUESTIONNAIRES: GAD7 TOTAL SCORE: 9

## 2023-07-21 ASSESSMENT — PATIENT HEALTH QUESTIONNAIRE - PHQ9
SUM OF ALL RESPONSES TO PHQ QUESTIONS 1-9: 15
10. IF YOU CHECKED OFF ANY PROBLEMS, HOW DIFFICULT HAVE THESE PROBLEMS MADE IT FOR YOU TO DO YOUR WORK, TAKE CARE OF THINGS AT HOME, OR GET ALONG WITH OTHER PEOPLE: SOMEWHAT DIFFICULT

## 2023-07-21 NOTE — PROGRESS NOTES
"  Assessment & Plan     Moderate episode of recurrent major depressive disorder (H)  Has been tried on Prozac, Zoloft and Wellbutrin before.  Recently discontinued Prozac medication due to concerns of no much improvement of symptoms.  Discussed with the patient on initiation of an SNRI medication.  Effexor medication was discussed with the patient.  We discussed possible up titration from 37.5 mg that patient will take for 2 weeks and based on tolerability, can uptitrate to 75 mg daily thereafter.  Patient recommended to follow-up in clinic in around 6 weeks.  We also discussed on getting an updated lab work including a thyroid check the TSH.  We also discussed on the role of psychotherapy that works hand-in-hand with pharmacotherapy.  Recommended patient to reestablish care with a psychotherapist.  Mental health referral to psychotherapy team placed today.  - venlafaxine (EFFEXOR XR) 37.5 MG 24 hr capsule; Take 1 capsule (37.5 mg) by mouth daily for 14 days, THEN 2 capsules (75 mg) daily for 30 days.  - Adult Mental Health  Referral; Future    Hypothyroidism, unspecified type  - TSH WITH FREE T4 REFLEX; Future  - TSH WITH FREE T4 REFLEX             BMI:   Estimated body mass index is 29.36 kg/m  as calculated from the following:    Height as of this encounter: 1.575 m (5' 2\").    Weight as of this encounter: 72.8 kg (160 lb 8 oz).       Depression Screening Follow Up        7/20/2023    11:43 AM   PHQ   PHQ-9 Total Score 15   Q9: Thoughts of better off dead/self-harm past 2 weeks Several days   F/U: Thoughts of suicide or self-harm Yes   F/U: Self harm-plan No   F/U: Self-harm action No   F/U: Safety concerns No                 Follow Up  Patient referred to psychotherapy.  Follow Up Actions Taken  Crisis resource information provided in the After Visit Summary  Mental Health Referral placed    Discussed the following ways the patient can remain in a safe environment:  be around others      Mame Mayer, " MD [facility] CRISTOFER Bowman is a 57 year old, presenting for the following health issues:  Recheck Medication        7/21/2023    12:44 PM   Additional Questions   Roomed by Juan Alberto     History of Present Illness       Mental Health Follow-up:  Patient presents to follow-up on Depression & Anxiety.Patient's depression since last visit has been:  Bad  The patient is having other symptoms associated with depression.  Patient's anxiety since last visit has been:  Bad  The patient is having other symptoms associated with anxiety.  Any significant life events: relationship concerns  Patient is not feeling anxious or having panic attacks.  Patient has no concerns about alcohol or drug use.    She eats 0-1 servings of fruits and vegetables daily.She consumes 3 sweetened beverage(s) daily.She exercises with enough effort to increase her heart rate 10 to 19 minutes per day.  She exercises with enough effort to increase her heart rate 3 or less days per week.   She is taking medications regularly.    Today's PHQ-9         PHQ-9 Total Score: 15    PHQ-9 Q9 Thoughts of better off dead/self-harm past 2 weeks :   Several days  Thoughts of suicide or self harm: (P) Yes  Self-harm Plan:   (P) No  Self-harm Action:     (P) No  Safety concerns for self or others: (P) No    How difficult have these problems made it for you to do your work, take care of things at home, or get along with other people: Somewhat difficult  Today's SUSANNA-7 Score: 9       Was on fluoxetine for around 3-4 years and stopped taking it since has felt it did not help.  Did have some suicidal thoughts during vacation during the month of June but feels like the symptoms have improved.  Does not currently follow up with therapist.            Review of Systems   Constitutional, HEENT, cardiovascular, pulmonary, gi and gu systems are negative, except as otherwise noted.      Objective    /68   Pulse 96   Temp 97.3  F (36.3  C)  "(Tympanic)   Resp 20   Ht 1.575 m (5' 2\")   Wt 72.8 kg (160 lb 8 oz)   LMP 08/25/2009   SpO2 96%   BMI 29.36 kg/m    Body mass index is 29.36 kg/m .  Physical Exam   GENERAL: healthy, alert and no distress  RESP: lungs clear to auscultation - no rales, rhonchi or wheezes  CV: regular rate and rhythm, normal S1 S2  MS: no gross musculoskeletal defects noted, no edema  NEURO: Normal strength and tone, mentation intact and speech normal  PSYCH: mentation appears normal, affect normal/bright                    "

## 2023-09-01 ENCOUNTER — MYC MEDICAL ADVICE (OUTPATIENT)
Dept: INTERNAL MEDICINE | Facility: CLINIC | Age: 58
End: 2023-09-01

## 2023-09-01 DIAGNOSIS — F33.1 MODERATE EPISODE OF RECURRENT MAJOR DEPRESSIVE DISORDER (H): ICD-10-CM

## 2023-09-01 RX ORDER — VENLAFAXINE HYDROCHLORIDE 37.5 MG/1
CAPSULE, EXTENDED RELEASE ORAL
Qty: 74 CAPSULE | Refills: 0 | Status: SHIPPED | OUTPATIENT
Start: 2023-09-01 | End: 2023-10-20

## 2023-09-01 RX ORDER — VENLAFAXINE HYDROCHLORIDE 37.5 MG/1
75 CAPSULE, EXTENDED RELEASE ORAL DAILY
Qty: 60 CAPSULE | Refills: 1 | Status: SHIPPED | OUTPATIENT
Start: 2023-09-01 | End: 2023-11-24

## 2023-09-01 ASSESSMENT — ANXIETY QUESTIONNAIRES
6. BECOMING EASILY ANNOYED OR IRRITABLE: NOT AT ALL
GAD7 TOTAL SCORE: 0
4. TROUBLE RELAXING: NOT AT ALL
1. FEELING NERVOUS, ANXIOUS, OR ON EDGE: NOT AT ALL
3. WORRYING TOO MUCH ABOUT DIFFERENT THINGS: NOT AT ALL
5. BEING SO RESTLESS THAT IT IS HARD TO SIT STILL: NOT AT ALL
2. NOT BEING ABLE TO STOP OR CONTROL WORRYING: NOT AT ALL
IF YOU CHECKED OFF ANY PROBLEMS ON THIS QUESTIONNAIRE, HOW DIFFICULT HAVE THESE PROBLEMS MADE IT FOR YOU TO DO YOUR WORK, TAKE CARE OF THINGS AT HOME, OR GET ALONG WITH OTHER PEOPLE: NOT DIFFICULT AT ALL
7. FEELING AFRAID AS IF SOMETHING AWFUL MIGHT HAPPEN: NOT AT ALL

## 2023-10-20 ENCOUNTER — OFFICE VISIT (OUTPATIENT)
Dept: INTERNAL MEDICINE | Facility: CLINIC | Age: 58
End: 2023-10-20
Payer: COMMERCIAL

## 2023-10-20 VITALS
HEIGHT: 62 IN | WEIGHT: 159.9 LBS | TEMPERATURE: 97.4 F | SYSTOLIC BLOOD PRESSURE: 114 MMHG | OXYGEN SATURATION: 96 % | DIASTOLIC BLOOD PRESSURE: 68 MMHG | RESPIRATION RATE: 20 BRPM | HEART RATE: 99 BPM | BODY MASS INDEX: 29.43 KG/M2

## 2023-10-20 DIAGNOSIS — Z12.31 VISIT FOR SCREENING MAMMOGRAM: ICD-10-CM

## 2023-10-20 DIAGNOSIS — Z12.11 SPECIAL SCREENING FOR MALIGNANT NEOPLASMS, COLON: ICD-10-CM

## 2023-10-20 DIAGNOSIS — Z00.00 ROUTINE GENERAL MEDICAL EXAMINATION AT A HEALTH CARE FACILITY: Primary | ICD-10-CM

## 2023-10-20 DIAGNOSIS — F33.1 MODERATE EPISODE OF RECURRENT MAJOR DEPRESSIVE DISORDER (H): ICD-10-CM

## 2023-10-20 DIAGNOSIS — Z12.11 SCREEN FOR COLON CANCER: ICD-10-CM

## 2023-10-20 LAB
ALBUMIN SERPL BCG-MCNC: 4.3 G/DL (ref 3.5–5.2)
ALP SERPL-CCNC: 80 U/L (ref 35–104)
ALT SERPL W P-5'-P-CCNC: 32 U/L (ref 0–50)
ANION GAP SERPL CALCULATED.3IONS-SCNC: 10 MMOL/L (ref 7–15)
AST SERPL W P-5'-P-CCNC: 30 U/L (ref 0–45)
BILIRUB SERPL-MCNC: 0.3 MG/DL
BUN SERPL-MCNC: 20.8 MG/DL (ref 6–20)
CALCIUM SERPL-MCNC: 9.1 MG/DL (ref 8.6–10)
CHLORIDE SERPL-SCNC: 104 MMOL/L (ref 98–107)
CHOLEST SERPL-MCNC: 192 MG/DL
CREAT SERPL-MCNC: 0.74 MG/DL (ref 0.51–0.95)
DEPRECATED HCO3 PLAS-SCNC: 25 MMOL/L (ref 22–29)
EGFRCR SERPLBLD CKD-EPI 2021: >90 ML/MIN/1.73M2
ERYTHROCYTE [DISTWIDTH] IN BLOOD BY AUTOMATED COUNT: 13 % (ref 10–15)
GLUCOSE SERPL-MCNC: 113 MG/DL (ref 70–99)
HCT VFR BLD AUTO: 41.1 % (ref 35–47)
HDLC SERPL-MCNC: 52 MG/DL
HGB BLD-MCNC: 13.9 G/DL (ref 11.7–15.7)
LDLC SERPL CALC-MCNC: 119 MG/DL
MCH RBC QN AUTO: 31 PG (ref 26.5–33)
MCHC RBC AUTO-ENTMCNC: 33.8 G/DL (ref 31.5–36.5)
MCV RBC AUTO: 92 FL (ref 78–100)
NONHDLC SERPL-MCNC: 140 MG/DL
PLATELET # BLD AUTO: 242 10E3/UL (ref 150–450)
POTASSIUM SERPL-SCNC: 4.2 MMOL/L (ref 3.4–5.3)
PROT SERPL-MCNC: 6.9 G/DL (ref 6.4–8.3)
RBC # BLD AUTO: 4.48 10E6/UL (ref 3.8–5.2)
SODIUM SERPL-SCNC: 139 MMOL/L (ref 135–145)
TRIGL SERPL-MCNC: 103 MG/DL
WBC # BLD AUTO: 5 10E3/UL (ref 4–11)

## 2023-10-20 PROCEDURE — 90471 IMMUNIZATION ADMIN: CPT | Performed by: INTERNAL MEDICINE

## 2023-10-20 PROCEDURE — 90682 RIV4 VACC RECOMBINANT DNA IM: CPT | Performed by: INTERNAL MEDICINE

## 2023-10-20 PROCEDURE — 85027 COMPLETE CBC AUTOMATED: CPT | Performed by: INTERNAL MEDICINE

## 2023-10-20 PROCEDURE — 80061 LIPID PANEL: CPT | Performed by: INTERNAL MEDICINE

## 2023-10-20 PROCEDURE — 80053 COMPREHEN METABOLIC PANEL: CPT | Performed by: INTERNAL MEDICINE

## 2023-10-20 PROCEDURE — 99213 OFFICE O/P EST LOW 20 MIN: CPT | Mod: 25 | Performed by: INTERNAL MEDICINE

## 2023-10-20 PROCEDURE — 99396 PREV VISIT EST AGE 40-64: CPT | Mod: 25 | Performed by: INTERNAL MEDICINE

## 2023-10-20 PROCEDURE — 36415 COLL VENOUS BLD VENIPUNCTURE: CPT | Performed by: INTERNAL MEDICINE

## 2023-10-20 ASSESSMENT — ANXIETY QUESTIONNAIRES
2. NOT BEING ABLE TO STOP OR CONTROL WORRYING: NOT AT ALL
5. BEING SO RESTLESS THAT IT IS HARD TO SIT STILL: NOT AT ALL
4. TROUBLE RELAXING: NOT AT ALL
GAD7 TOTAL SCORE: 0
IF YOU CHECKED OFF ANY PROBLEMS ON THIS QUESTIONNAIRE, HOW DIFFICULT HAVE THESE PROBLEMS MADE IT FOR YOU TO DO YOUR WORK, TAKE CARE OF THINGS AT HOME, OR GET ALONG WITH OTHER PEOPLE: NOT DIFFICULT AT ALL
1. FEELING NERVOUS, ANXIOUS, OR ON EDGE: NOT AT ALL
GAD7 TOTAL SCORE: 0
7. FEELING AFRAID AS IF SOMETHING AWFUL MIGHT HAPPEN: NOT AT ALL
3. WORRYING TOO MUCH ABOUT DIFFERENT THINGS: NOT AT ALL
6. BECOMING EASILY ANNOYED OR IRRITABLE: NOT AT ALL

## 2023-10-20 ASSESSMENT — PATIENT HEALTH QUESTIONNAIRE - PHQ9
SUM OF ALL RESPONSES TO PHQ QUESTIONS 1-9: 1
SUM OF ALL RESPONSES TO PHQ QUESTIONS 1-9: 1
10. IF YOU CHECKED OFF ANY PROBLEMS, HOW DIFFICULT HAVE THESE PROBLEMS MADE IT FOR YOU TO DO YOUR WORK, TAKE CARE OF THINGS AT HOME, OR GET ALONG WITH OTHER PEOPLE: NOT DIFFICULT AT ALL

## 2023-10-20 ASSESSMENT — PAIN SCALES - GENERAL: PAINLEVEL: NO PAIN (0)

## 2023-10-20 NOTE — PROGRESS NOTES
SUBJECTIVE:   CC: Gracie is an 57 year old who presents for preventive health visit.       10/20/2023    10:40 AM   Additional Questions   Roomed by Juan Alberto ABRAMS    Today's PHQ-9 Score:       10/20/2023     1:12 AM   PHQ-9 SCORE   PHQ-9 Total Score MyChart 1 (Minimal depression)   PHQ-9 Total Score 1         History of Present Illness       Mental Health Follow-up:  Patient presents to follow-up on Depression & Anxiety.Patient's depression since last visit has been:  Better  The patient is not having other symptoms associated with depression.  Patient's anxiety since last visit has been:  Better  The patient is not having other symptoms associated with anxiety.  Any significant life events: No  Patient is not feeling anxious or having panic attacks.  Patient has no concerns about alcohol or drug use.    She eats 0-1 servings of fruits and vegetables daily.She consumes 3 sweetened beverage(s) daily.She exercises with enough effort to increase her heart rate 9 or less minutes per day.  She exercises with enough effort to increase her heart rate 3 or less days per week. She is missing 7 dose(s) of medications per week.  She is not taking prescribed medications regularly due to remembering to take.              Social History     Tobacco Use    Smoking status: Never    Smokeless tobacco: Never   Substance Use Topics    Alcohol use: Yes     Comment: rare             2021     5:18 PM   Alcohol Use   Prescreen: >3 drinks/day or >7 drinks/week? No     Reviewed orders with patient.  Reviewed health maintenance and updated orders accordingly - Yes  Lab work is in process    Breast Cancer Screenin/29/2021     5:22 PM   Breast CA Risk Assessment (FHS-7)   Do you have a family history of breast, colon, or ovarian cancer? No / Unknown         Mammogram Screening: Recommended mammography every 1-2 years with patient discussion and risk factor consideration  Pertinent mammograms are reviewed under the imaging  "tab.    History of abnormal Pap smear:       Latest Ref Rng & Units 11/30/2021    11:02 AM 3/28/2018     9:26 AM 1/13/2015    12:00 AM   PAP / HPV   PAP  Negative for Intraepithelial Lesion or Malignancy (NILM)      PAP (Historical)   NIL  NIL    HPV 16 DNA Negative Negative      HPV 18 DNA Negative Negative      Other HR HPV Negative Negative        Reviewed and updated as needed this visit by clinical staff   Tobacco  Allergies  Meds  Problems             Reviewed and updated as needed this visit by Provider     Meds  Problems                Review of Systems  CONSTITUTIONAL: NEGATIVE for fever, chills, change in weight  INTEGUMENTARY/SKIN: NEGATIVE for worrisome rashes, moles or lesions  EYES: NEGATIVE for vision changes or irritation  ENT: NEGATIVE for ear, mouth and throat problems  RESP: NEGATIVE for significant cough or SOB  BREAST: NEGATIVE for masses, tenderness or discharge  CV: NEGATIVE for chest pain, palpitations or peripheral edema  GI: NEGATIVE for nausea, abdominal pain, heartburn, or change in bowel habits  : NEGATIVE for unusual urinary or vaginal symptoms. No vaginal bleeding.  MUSCULOSKELETAL: NEGATIVE for significant arthralgias or myalgia  NEURO: NEGATIVE for weakness, dizziness or paresthesias  PSYCHIATRIC: NEGATIVE for changes in mood or affect      OBJECTIVE:   /68   Pulse 99   Temp 97.4  F (36.3  C) (Tympanic)   Resp 20   Ht 1.575 m (5' 2\")   Wt 72.5 kg (159 lb 14.4 oz)   LMP 08/25/2009   SpO2 96%   BMI 29.25 kg/m    Physical Exam  GENERAL: healthy, alert and no distress  EYES: Eyes grossly normal to inspection, PERRL and conjunctivae and sclerae normal  HENT: ear canals and TM's normal, nose and mouth without ulcers or lesions  RESP: lungs clear to auscultation - no rales, rhonchi or wheezes  BREAST: normal without masses, tenderness or nipple discharge and no palpable axillary masses or adenopathy  CV: regular rate and rhythm, normal S1 S2.  ABDOMEN: soft, nontender, " "no hepatosplenomegaly, no masses.  MS: no gross musculoskeletal defects noted, no edema  SKIN: no suspicious lesions or rashes  NEURO: Normal strength and tone, mentation intact and speech normal  PSYCH: mentation appears normal, affect normal/bright        ASSESSMENT/PLAN:   (Z00.00) Routine general medical examination at a health care facility  (primary encounter diagnosis)  Comment: Fasting lab work has been ordered.  Mammogram ordered.  FIT testing for colon cancer screening as this is what patient prefers.  Plan: CBC with platelets, Comprehensive metabolic         panel, Lipid panel reflex to direct LDL Fasting            (F33.1) Moderate episode of recurrent major depressive disorder (H)  Comment: symptoms have improved. Continue effexor at 75mg    (Z12.11) Screen for colon cancer  Plan: Fecal colorectal cancer screen FIT - Future         (S+30)            (Z12.31) Visit for screening mammogram  Plan: MA SCREENING DIGITAL BILAT - Future  (s+30)            Patient has been advised of split billing requirements and indicates understanding: Yes      COUNSELING:       Regular exercise       Healthy diet/nutrition       Vision screening       Hearing screening      BMI:   Estimated body mass index is 29.25 kg/m  as calculated from the following:    Height as of this encounter: 1.575 m (5' 2\").    Weight as of this encounter: 72.5 kg (159 lb 14.4 oz).   Weight management plan: Discussed healthy diet and exercise guidelines      She reports that she has never smoked. She has never used smokeless tobacco.          Mame Mayer MD  [facility]  "

## 2023-10-20 NOTE — PROGRESS NOTES
"  {PROVIDER CHARTING PREFERENCE:704934}    Subjective   Gracie is a 57 year old, presenting for the following health issues:  Follow Up        10/20/2023    10:40 AM   Additional Questions   Roomed by Juan Alberto       History of Present Illness       Mental Health Follow-up:  Patient presents to follow-up on Depression & Anxiety.Patient's depression since last visit has been:  Better  The patient is not having other symptoms associated with depression.  Patient's anxiety since last visit has been:  Better  The patient is not having other symptoms associated with anxiety.  Any significant life events: No  Patient is not feeling anxious or having panic attacks.  Patient has no concerns about alcohol or drug use.    She eats 0-1 servings of fruits and vegetables daily.She consumes 3 sweetened beverage(s) daily.She exercises with enough effort to increase her heart rate 9 or less minutes per day.  She exercises with enough effort to increase her heart rate 3 or less days per week. She is missing 7 dose(s) of medications per week.  She is not taking prescribed medications regularly due to remembering to take.       {MA/LPN/RN Pre-Provider Visit Orders- hCG/UA/Strep (Optional):207999}  Hypothyroidism Follow-up    Since last visit, patient describes the following symptoms: Weight stable, no hair loss, no skin changes, no constipation, no loose stools  {additonal problems for provider to add (Optional):479292}      Review of Systems   {ROS COMP (Optional):552354}      Objective    /68   Pulse 99   Temp 97.4  F (36.3  C) (Tympanic)   Resp 20   Ht 1.575 m (5' 2\")   Wt 72.5 kg (159 lb 14.4 oz)   LMP 08/25/2009   SpO2 96%   BMI 29.25 kg/m    Body mass index is 29.25 kg/m .  Physical Exam   {Exam List (Optional):154940}    {Diagnostic Test Results (Optional):796921}    {AMBULATORY ATTESTATION (Optional):737065}              "

## 2023-11-10 ENCOUNTER — HOSPITAL ENCOUNTER (OUTPATIENT)
Dept: MAMMOGRAPHY | Facility: CLINIC | Age: 58
Discharge: HOME | End: 2023-11-10
Attending: INTERNAL MEDICINE | Admitting: INTERNAL MEDICINE
Payer: COMMERCIAL

## 2023-11-10 DIAGNOSIS — Z12.31 VISIT FOR SCREENING MAMMOGRAM: ICD-10-CM

## 2023-11-10 PROCEDURE — 77067 SCR MAMMO BI INCL CAD: CPT

## 2023-11-24 ENCOUNTER — MYC REFILL (OUTPATIENT)
Dept: INTERNAL MEDICINE | Facility: CLINIC | Age: 58
End: 2023-11-24
Payer: COMMERCIAL

## 2023-11-24 DIAGNOSIS — F33.1 MODERATE EPISODE OF RECURRENT MAJOR DEPRESSIVE DISORDER (H): ICD-10-CM

## 2023-11-27 RX ORDER — VENLAFAXINE HYDROCHLORIDE 37.5 MG/1
75 CAPSULE, EXTENDED RELEASE ORAL DAILY
Qty: 60 CAPSULE | Refills: 1 | Status: SHIPPED | OUTPATIENT
Start: 2023-11-27 | End: 2024-01-11

## 2024-01-11 ENCOUNTER — MYC REFILL (OUTPATIENT)
Dept: INTERNAL MEDICINE | Facility: CLINIC | Age: 59
End: 2024-01-11
Payer: COMMERCIAL

## 2024-01-11 DIAGNOSIS — F33.1 MODERATE EPISODE OF RECURRENT MAJOR DEPRESSIVE DISORDER (H): ICD-10-CM

## 2024-01-11 RX ORDER — VENLAFAXINE HYDROCHLORIDE 37.5 MG/1
75 CAPSULE, EXTENDED RELEASE ORAL DAILY
Qty: 180 CAPSULE | Refills: 2 | Status: SHIPPED | OUTPATIENT
Start: 2024-01-11 | End: 2024-05-12

## 2024-05-12 ENCOUNTER — MYC REFILL (OUTPATIENT)
Dept: INTERNAL MEDICINE | Facility: CLINIC | Age: 59
End: 2024-05-12
Payer: COMMERCIAL

## 2024-05-12 DIAGNOSIS — F33.1 MODERATE EPISODE OF RECURRENT MAJOR DEPRESSIVE DISORDER (H): ICD-10-CM

## 2024-05-14 RX ORDER — VENLAFAXINE HYDROCHLORIDE 37.5 MG/1
75 CAPSULE, EXTENDED RELEASE ORAL DAILY
Qty: 180 CAPSULE | Refills: 2 | Status: SHIPPED | OUTPATIENT
Start: 2024-05-14 | End: 2024-09-11

## 2024-06-19 ENCOUNTER — TELEPHONE (OUTPATIENT)
Dept: INTERNAL MEDICINE | Facility: CLINIC | Age: 59
End: 2024-06-19
Payer: COMMERCIAL

## 2024-06-19 NOTE — LETTER
June 19, 2024    To  Gracie Aranda  [address] [address] [facility] [address]    Your team at [facility] cares about your health. We have reviewed your chart and based on our findings; we are making the following recommendations to better manage your health.     You are in particular need of attention regarding the following:     Call or MyChart message your clinic to schedule a colonoscopy, schedule/ a FIT Test, or order a Cologuard test. If you are unsure what type of test you need, please call your clinic and speak to clinic staff.   Colon cancer is now the second leading cause of cancer-related deaths in the [place] for both men and women and there are over 130,000 new cases and 50,000 deaths per year from colon cancer. Colonoscopies can prevent 90-95% of these deaths. Problem lesions can be removed before they ever become cancer. This test is not only looking for cancer, but also getting rid of precancerous lesions.   If you are under/uninsured, we recommend you contact the Related Content Database (RCDb) Program.Related Content Database (RCDb) is a free colorectal cancer screening program that provides colonoscopies for eligible under/uninsured [place] men and women. If you are interested in receiving a free colonoscopy, please call Related Content Database (RCDb) at t [phone] (mention code [employer]) to see if you're eligible. Please have them send us the results.   Please call [phone] to schedule a colonoscopy.  Schedule a primary care office visit with your provider for a Pap Smear to screen for Cervical Cancer.  Schedule an office visit with your PRIMARY CARE PHYSICIAN for mental health follow-up.  Complete the attached questionnaires to ensure your mental health needs are being properly met.  Please fill them out and send back to us via [employer], and feel free to call us with any questions or concerns at [phone].      If you have already completed these items, please contact the clinic via phone or   [employer] so your care  team can review and update your records. Thank you for   choosing [facility] for your healthcare needs. For any questions,   concerns, or to schedule an appointment please contact our clinic.    Healthy Regards,      Your [facility] Care Team

## 2024-06-19 NOTE — TELEPHONE ENCOUNTER
Patient Quality Outreach    Patient is due for the following:   Colon Cancer Screening  Cervical Cancer Screening - PAP Needed  Depression  -  PHQ-9 needed and Depression follow-up visit      Topic Date Due    Hepatitis B Vaccine (1 of 3 - 19+ 3-dose series) Never done    Zoster (Shingles) Vaccine (1 of 2) Never done    COVID-19 Vaccine (4 - 2023-24 season) 09/01/2023       Next Steps:   Schedule a office visit for Depression  -  PHQ-9 needed and Depression follow-up visit    Type of outreach:    Sent letter.      Questions for provider review:    None           Sheldon Garcia MA  Chart routed to none.

## 2024-07-01 DIAGNOSIS — Z12.11 SPECIAL SCREENING FOR MALIGNANT NEOPLASMS, COLON: ICD-10-CM

## 2024-07-01 PROCEDURE — 82274 ASSAY TEST FOR BLOOD FECAL: CPT | Performed by: INTERNAL MEDICINE

## 2024-07-02 LAB — HEMOCCULT STL QL IA: NEGATIVE

## 2024-09-11 ENCOUNTER — MYC REFILL (OUTPATIENT)
Dept: INTERNAL MEDICINE | Facility: CLINIC | Age: 59
End: 2024-09-11
Payer: COMMERCIAL

## 2024-09-11 DIAGNOSIS — F33.1 MODERATE EPISODE OF RECURRENT MAJOR DEPRESSIVE DISORDER (H): ICD-10-CM

## 2024-09-12 RX ORDER — VENLAFAXINE HYDROCHLORIDE 37.5 MG/1
75 CAPSULE, EXTENDED RELEASE ORAL DAILY
Qty: 180 CAPSULE | Refills: 2 | Status: SHIPPED | OUTPATIENT
Start: 2024-09-12

## 2024-09-20 ENCOUNTER — PATIENT OUTREACH (OUTPATIENT)
Dept: CARE COORDINATION | Facility: CLINIC | Age: 59
End: 2024-09-20
Payer: COMMERCIAL

## 2024-10-04 ENCOUNTER — PATIENT OUTREACH (OUTPATIENT)
Dept: CARE COORDINATION | Facility: CLINIC | Age: 59
End: 2024-10-04
Payer: COMMERCIAL

## 2024-11-12 SDOH — HEALTH STABILITY: PHYSICAL HEALTH: ON AVERAGE, HOW MANY DAYS PER WEEK DO YOU ENGAGE IN MODERATE TO STRENUOUS EXERCISE (LIKE A BRISK WALK)?: 2 DAYS

## 2024-11-12 SDOH — HEALTH STABILITY: PHYSICAL HEALTH: ON AVERAGE, HOW MANY MINUTES DO YOU ENGAGE IN EXERCISE AT THIS LEVEL?: 40 MIN

## 2024-11-12 ASSESSMENT — SOCIAL DETERMINANTS OF HEALTH (SDOH): HOW OFTEN DO YOU GET TOGETHER WITH FRIENDS OR RELATIVES?: ONCE A WEEK

## 2024-11-19 ENCOUNTER — HOSPITAL ENCOUNTER (OUTPATIENT)
Dept: MAMMOGRAPHY | Facility: CLINIC | Age: 59
Discharge: HOME | End: 2024-11-19
Attending: INTERNAL MEDICINE
Payer: COMMERCIAL

## 2024-11-19 DIAGNOSIS — Z12.31 VISIT FOR SCREENING MAMMOGRAM: ICD-10-CM

## 2024-11-19 PROCEDURE — 77063 BREAST TOMOSYNTHESIS BI: CPT

## 2024-11-19 PROCEDURE — 77067 SCR MAMMO BI INCL CAD: CPT

## 2024-12-18 SDOH — HEALTH STABILITY: PHYSICAL HEALTH: ON AVERAGE, HOW MANY DAYS PER WEEK DO YOU ENGAGE IN MODERATE TO STRENUOUS EXERCISE (LIKE A BRISK WALK)?: 2 DAYS

## 2024-12-18 SDOH — HEALTH STABILITY: PHYSICAL HEALTH: ON AVERAGE, HOW MANY MINUTES DO YOU ENGAGE IN EXERCISE AT THIS LEVEL?: 40 MIN

## 2024-12-18 ASSESSMENT — SOCIAL DETERMINANTS OF HEALTH (SDOH): HOW OFTEN DO YOU GET TOGETHER WITH FRIENDS OR RELATIVES?: ONCE A WEEK

## 2024-12-18 ASSESSMENT — PATIENT HEALTH QUESTIONNAIRE - PHQ9
10. IF YOU CHECKED OFF ANY PROBLEMS, HOW DIFFICULT HAVE THESE PROBLEMS MADE IT FOR YOU TO DO YOUR WORK, TAKE CARE OF THINGS AT HOME, OR GET ALONG WITH OTHER PEOPLE: NOT DIFFICULT AT ALL
SUM OF ALL RESPONSES TO PHQ QUESTIONS 1-9: 8
SUM OF ALL RESPONSES TO PHQ QUESTIONS 1-9: 8

## 2024-12-19 ENCOUNTER — OFFICE VISIT (OUTPATIENT)
Dept: INTERNAL MEDICINE | Facility: CLINIC | Age: 59
End: 2024-12-19
Payer: COMMERCIAL

## 2024-12-19 VITALS
TEMPERATURE: 97.4 F | SYSTOLIC BLOOD PRESSURE: 118 MMHG | HEART RATE: 71 BPM | HEIGHT: 62 IN | OXYGEN SATURATION: 97 % | RESPIRATION RATE: 20 BRPM | WEIGHT: 162.8 LBS | BODY MASS INDEX: 29.96 KG/M2 | DIASTOLIC BLOOD PRESSURE: 72 MMHG

## 2024-12-19 DIAGNOSIS — E03.8 OTHER SPECIFIED HYPOTHYROIDISM: ICD-10-CM

## 2024-12-19 DIAGNOSIS — F33.42 RECURRENT MAJOR DEPRESSIVE DISORDER, IN FULL REMISSION (H): ICD-10-CM

## 2024-12-19 DIAGNOSIS — Z00.00 ROUTINE GENERAL MEDICAL EXAMINATION AT A HEALTH CARE FACILITY: Primary | ICD-10-CM

## 2024-12-19 PROBLEM — F33.1 MODERATE EPISODE OF RECURRENT MAJOR DEPRESSIVE DISORDER (H): Status: RESOLVED | Noted: 2017-10-31 | Resolved: 2024-12-19

## 2024-12-19 NOTE — PROGRESS NOTES
"Preventive Care Visit  [facility]  Mame Mayer MD, Internal Medicine  Dec 19, 2024      Assessment & Plan     Routine general medical examination at a health care facility  Fasting lab work ordered. Patient is not fasting today, will be making a lab only appointment to complete lab work. Declined pap smear in office today, patient wants to make an appointment for that due to being in a rush to get to work today.up to date with mammogram , up to date with fit test for colon cancer screening.  - Lipid panel reflex to direct LDL Fasting; Future  - CBC with platelets; Future  - Comprehensive metabolic panel; Future    Other specified hypothyroidism  Has been off levothyroxine for around 4 years now. Does not endorse any clinical symptoms. Update thyroid function test lab work.  - TSH WITH FREE T4 REFLEX; Future    Recurrent major depressive disorder, in full remission (H)  On effexor medication.symptoms stable. Continue medication at current dose.    Patient has been advised of split billing requirements and indicates understanding: Yes        BMI  Estimated body mass index is 29.78 kg/m  as calculated from the following:    Height as of this encounter: 1.575 m (5' 2\").    Weight as of this encounter: 73.8 kg (162 lb 12.8 oz).       Counseling  Appropriate preventive services were discussed with this patient, including applicable screening as appropriate for nutrition, physical activity            Subjective   Gracie is a 59 year old, presenting for the following:  Physical        12/19/2024    12:48 PM   Additional Questions   Roomed by Juan Alberto   Accompanied by Self          HPI  Answers submitted by the patient for this visit:  Patient Health Questionnaire (Submitted on 12/18/2024)  If you checked off any problems, how difficult have these problems made it for you to do your work, take care of things at home, or get along with other people?: Not difficult at all  PHQ9 TOTAL SCORE: " 8          Health Care Directive  Patient does not have a Health Care Directive: Discussed advance care planning with patient; however, patient declined at this time.      12/18/2024   General Health   How would you rate your overall physical health? Good   Feel stress (tense, anxious, or unable to sleep) Only a little   (!) STRESS CONCERN      12/18/2024   Nutrition   Three or more servings of calcium each day? Yes   Diet: Regular (no restrictions)   How many servings of fruit and vegetables per day? (!) 0-1   How many sweetened beverages each day? (!) 3         12/18/2024   Exercise   Days per week of moderate/strenous exercise 2 days   Average minutes spent exercising at this level 40 min   (!) EXERCISE CONCERN      12/18/2024   Social Factors   Frequency of gathering with friends or relatives Once a week   Worry food won't last until get money to buy more No   Food not last or not have enough money for food? No   Do you have housing? (Housing is defined as stable permanent housing and does not include staying ouside in a car, in a tent, in an abandoned building, in an overnight shelter, or couch-surfing.) Yes   Are you worried about losing your housing? No   Lack of transportation? No   Unable to get utilities (heat,electricity)? No         12/18/2024   Fall Risk   Fallen 2 or more times in the past year? No   Trouble with walking or balance? No          12/18/2024   Dental   Dentist two times every year? Yes         11/12/2024   TB Screening   Were you born outside of the [place]? Yes       Today's PHQ-9 Score:       12/18/2024     2:18 PM   PHQ-9 SCORE   PHQ-9 Total Score MyChart 8 (Mild depression)   PHQ-9 Total Score 8        Patient-reported         12/18/2024   Substance Use   Alcohol more than 3/day or more than 7/wk No   Do you use any other substances recreationally? No     Social History     Tobacco Use    Smoking status: Never    Smokeless tobacco: Never   Vaping Use    Vaping status: Never Used  "  Substance Use Topics    Alcohol use: Yes     Comment: rare    Drug use: No           11/19/2024   LAST FHS-7 RESULTS   1st degree relative breast or ovarian cancer No   Any relative bilateral breast cancer No   Any male have breast cancer No   Any ONE woman have BOTH breast AND ovarian cancer No   Any woman with breast cancer before 50yrs No   2 or more relatives with breast AND/OR ovarian cancer No   2 or more relatives with breast AND/OR bowel cancer No        Mammogram Screening - Mammogram every 1-2 years updated in Health Maintenance based on mutual decision making        12/18/2024   STI Screening   New sexual partner(s) since last STI/HIV test? No     History of abnormal Pap smear: No - age 30- 64 PAP with HPV every 5 years recommended        Latest Ref Rng & Units 11/30/2021    11:02 AM 3/28/2018     9:26 AM 1/13/2015    12:00 AM   PAP / HPV   PAP  Negative for Intraepithelial Lesion or Malignancy (NILM)      PAP (Historical)   NIL  NIL    HPV 16 DNA Negative Negative      HPV 18 DNA Negative Negative      Other HR HPV Negative Negative        ASCVD Risk   The 10-year ASCVD risk score (Tita CASTANEDA, et al., 2019) is: 2.3%    Values used to calculate the score:      Age: 59 years      Sex: Female      Is Non- : No      Diabetic: No      Tobacco smoker: No      Systolic Blood Pressure: 118 mmHg      Is BP treated: No      HDL Cholesterol: 52 mg/dL      Total Cholesterol: 192 mg/dL           Reviewed and updated as needed this visit by Provider                          Review of Systems  Constitutional, HEENT, cardiovascular, pulmonary, gi and gu systems are negative, except as otherwise noted.     Objective    Exam  /72 (BP Location: Right arm, Patient Position: Sitting, Cuff Size: Adult Regular)   Pulse 71   Temp 97.4  F (36.3  C) (Tympanic)   Resp 20   Ht 1.575 m (5' 2\")   Wt 73.8 kg (162 lb 12.8 oz)   LMP 08/25/2009   SpO2 97%   BMI 29.78 kg/m     Estimated body " "mass index is 29.78 kg/m  as calculated from the following:    Height as of this encounter: 1.575 m (5' 2\").    Weight as of this encounter: 73.8 kg (162 lb 12.8 oz).    Physical Exam  GENERAL: alert and no distress  EYES: Eyes grossly normal to inspection, PERRL and conjunctivae and sclerae normal  HENT: ear canals and TM's normal, nose and mouth without ulcers or lesions  RESP: lungs clear to auscultation - no rales, rhonchi or wheezes  CV: regular rate and rhythm, normal S1 S2  ABDOMEN: soft, nontender, no hepatosplenomegaly, no masses   MS: no gross musculoskeletal defects noted, no edema  SKIN: no suspicious lesions or rashes  NEURO: Normal strength and tone, mentation intact and speech normal  PSYCH: mentation appears normal, affect normal.        Signed Electronically by: Mame Mayer MD    "

## 2025-05-12 ASSESSMENT — PATIENT HEALTH QUESTIONNAIRE - PHQ9
SUM OF ALL RESPONSES TO PHQ QUESTIONS 1-9: 7
10. IF YOU CHECKED OFF ANY PROBLEMS, HOW DIFFICULT HAVE THESE PROBLEMS MADE IT FOR YOU TO DO YOUR WORK, TAKE CARE OF THINGS AT HOME, OR GET ALONG WITH OTHER PEOPLE: SOMEWHAT DIFFICULT
SUM OF ALL RESPONSES TO PHQ QUESTIONS 1-9: 7

## 2025-05-13 ENCOUNTER — OFFICE VISIT (OUTPATIENT)
Dept: INTERNAL MEDICINE | Facility: CLINIC | Age: 60
End: 2025-05-13
Payer: COMMERCIAL

## 2025-05-13 VITALS
HEIGHT: 62 IN | OXYGEN SATURATION: 98 % | RESPIRATION RATE: 18 BRPM | BODY MASS INDEX: 29.66 KG/M2 | TEMPERATURE: 97.2 F | SYSTOLIC BLOOD PRESSURE: 116 MMHG | WEIGHT: 161.2 LBS | HEART RATE: 104 BPM | DIASTOLIC BLOOD PRESSURE: 72 MMHG

## 2025-05-13 DIAGNOSIS — F33.42 RECURRENT MAJOR DEPRESSIVE DISORDER, IN FULL REMISSION: ICD-10-CM

## 2025-05-13 DIAGNOSIS — R53.83 OTHER FATIGUE: Primary | ICD-10-CM

## 2025-05-13 DIAGNOSIS — E03.8 OTHER SPECIFIED HYPOTHYROIDISM: ICD-10-CM

## 2025-05-13 DIAGNOSIS — E03.9 HYPOTHYROIDISM, UNSPECIFIED TYPE: ICD-10-CM

## 2025-05-13 DIAGNOSIS — E55.9 VITAMIN D DEFICIENCY: ICD-10-CM

## 2025-05-13 LAB — VIT D+METAB SERPL-MCNC: 26 NG/ML (ref 20–50)

## 2025-05-13 PROCEDURE — 3074F SYST BP LT 130 MM HG: CPT | Performed by: INTERNAL MEDICINE

## 2025-05-13 PROCEDURE — 82306 VITAMIN D 25 HYDROXY: CPT | Performed by: INTERNAL MEDICINE

## 2025-05-13 PROCEDURE — 84439 ASSAY OF FREE THYROXINE: CPT | Performed by: INTERNAL MEDICINE

## 2025-05-13 PROCEDURE — 84443 ASSAY THYROID STIM HORMONE: CPT | Performed by: INTERNAL MEDICINE

## 2025-05-13 PROCEDURE — 36415 COLL VENOUS BLD VENIPUNCTURE: CPT | Performed by: INTERNAL MEDICINE

## 2025-05-13 PROCEDURE — 99214 OFFICE O/P EST MOD 30 MIN: CPT | Performed by: INTERNAL MEDICINE

## 2025-05-13 PROCEDURE — 3078F DIAST BP <80 MM HG: CPT | Performed by: INTERNAL MEDICINE

## 2025-05-13 NOTE — PROGRESS NOTES
"  Assessment & Plan     Other fatigue  Differentials for fatigue/tiredness/daytime sleepiness discussed with the patient.  As patient has been on thyroid medications in the past, we discussed on completing lab work including thyroid function test.  Patient will also be completing additional lab work including vitamin D check due to history of vitamin D deficiency in the past.  Currently not on levothyroxine or supplementation for vitamin D.  Does share having had recommendation to complete a sleep study in the past.  Sleep referral placed.  - Adult Sleep Eval & Management Referral; Future        Vitamin D deficiency  - Vitamin D Deficiency; Future  - Vitamin D Deficiency    Recurrent major depressive disorder, in full remission  Overall, symptoms have been stable.  Has been on Effexor for almost 2 years now.  Patient would like to continue with the medications as is, complete lab work as above and proceed with any further medication adjustments if needed.    Other specified hypothyroidism  Complete lab work as above.  - TSH WITH FREE T4 REFLEX          BMI  Estimated body mass index is 29.48 kg/m  as calculated from the following:    Height as of this encounter: 1.575 m (5' 2\").    Weight as of this encounter: 73.1 kg (161 lb 3.2 oz).             Subjective   Gracie is a 59 year old, presenting for the following health issues:  Recheck Medication        5/13/2025    10:46 AM   Additional Questions   Roomed by Juan Alberto   Accompanied by Self     History of Present Illness       Reason for visit:  Feeling more sleepy than usual, less energy   She is taking medications regularly.        Feeling more sleepy during the day. No work schedule change. Symptoms present for 1-2 weeks.  No new medicatons or supplements. Has been on effexor for almost 2 years.    Review of Systems  Constitutional, HEENT, cardiovascular, pulmonary, gi and gu systems are negative, except as otherwise noted.        Objective    /72 (BP Location: " "Right arm, Patient Position: Sitting, Cuff Size: Adult Regular)   Pulse 104   Temp 97.2  F (36.2  C) (Tympanic)   Resp 18   Ht 1.575 m (5' 2\")   Wt 73.1 kg (161 lb 3.2 oz)   LMP 08/25/2009   SpO2 98%   BMI 29.48 kg/m    Body mass index is 29.48 kg/m .  Physical Exam   GENERAL: alert and no distress  RESP: lungs clear to auscultation - no rales, rhonchi or wheezes  CV: regular rate and rhythm, normal S1 S2  MS: no gross musculoskeletal defects noted, no edema  NEURO: Normal strength and tone, mentation intact and speech normal  PSYCH: mentation appears normal, affect normal.            Signed Electronically by: Mame Mayer MD    "

## 2025-05-14 ENCOUNTER — PATIENT OUTREACH (OUTPATIENT)
Dept: CARE COORDINATION | Facility: CLINIC | Age: 60
End: 2025-05-14
Payer: COMMERCIAL

## 2025-05-14 ENCOUNTER — RESULTS FOLLOW-UP (OUTPATIENT)
Dept: INTERNAL MEDICINE | Facility: CLINIC | Age: 60
End: 2025-05-14

## 2025-05-14 DIAGNOSIS — E03.9 HYPOTHYROIDISM, UNSPECIFIED TYPE: Primary | ICD-10-CM

## 2025-05-14 LAB
T4 FREE SERPL-MCNC: 1.14 NG/DL (ref 0.9–1.7)
TSH SERPL DL<=0.005 MIU/L-ACNC: 7.33 UIU/ML (ref 0.3–4.2)

## 2025-05-14 RX ORDER — LEVOTHYROXINE SODIUM 50 UG/1
50 TABLET ORAL
Qty: 90 TABLET | Refills: 1 | Status: SHIPPED | OUTPATIENT
Start: 2025-05-14

## 2025-06-02 ENCOUNTER — LAB (OUTPATIENT)
Dept: LAB | Facility: CLINIC | Age: 60
End: 2025-06-02
Payer: COMMERCIAL

## 2025-06-02 DIAGNOSIS — Z00.00 ROUTINE GENERAL MEDICAL EXAMINATION AT A HEALTH CARE FACILITY: ICD-10-CM

## 2025-06-02 LAB
ALBUMIN SERPL BCG-MCNC: 4 G/DL (ref 3.5–5.2)
ALP SERPL-CCNC: 71 U/L (ref 40–150)
ALT SERPL W P-5'-P-CCNC: 28 U/L (ref 0–50)
ANION GAP SERPL CALCULATED.3IONS-SCNC: 11 MMOL/L (ref 7–15)
AST SERPL W P-5'-P-CCNC: 30 U/L (ref 0–45)
BILIRUB SERPL-MCNC: 0.4 MG/DL
BUN SERPL-MCNC: 16 MG/DL (ref 6–20)
CALCIUM SERPL-MCNC: 9.1 MG/DL (ref 8.8–10.4)
CHLORIDE SERPL-SCNC: 101 MMOL/L (ref 98–107)
CHOLEST SERPL-MCNC: 187 MG/DL
CREAT SERPL-MCNC: 0.77 MG/DL (ref 0.51–0.95)
EGFRCR SERPLBLD CKD-EPI 2021: 89 ML/MIN/1.73M2
ERYTHROCYTE [DISTWIDTH] IN BLOOD BY AUTOMATED COUNT: 12.6 % (ref 10–15)
FASTING STATUS PATIENT QL REPORTED: YES
FASTING STATUS PATIENT QL REPORTED: YES
GLUCOSE SERPL-MCNC: 95 MG/DL (ref 70–99)
HCO3 SERPL-SCNC: 25 MMOL/L (ref 22–29)
HCT VFR BLD AUTO: 39.1 % (ref 35–47)
HDLC SERPL-MCNC: 50 MG/DL
HGB BLD-MCNC: 13.9 G/DL (ref 11.7–15.7)
LDLC SERPL CALC-MCNC: 126 MG/DL
MCH RBC QN AUTO: 31.6 PG (ref 26.5–33)
MCHC RBC AUTO-ENTMCNC: 35.5 G/DL (ref 31.5–36.5)
MCV RBC AUTO: 89 FL (ref 78–100)
NONHDLC SERPL-MCNC: 137 MG/DL
PLATELET # BLD AUTO: 236 10E3/UL (ref 150–450)
POTASSIUM SERPL-SCNC: 3.9 MMOL/L (ref 3.4–5.3)
PROT SERPL-MCNC: 6.8 G/DL (ref 6.4–8.3)
RBC # BLD AUTO: 4.4 10E6/UL (ref 3.8–5.2)
SODIUM SERPL-SCNC: 137 MMOL/L (ref 135–145)
TRIGL SERPL-MCNC: 57 MG/DL
WBC # BLD AUTO: 5.3 10E3/UL (ref 4–11)

## 2025-06-02 PROCEDURE — 80053 COMPREHEN METABOLIC PANEL: CPT

## 2025-06-02 PROCEDURE — 80061 LIPID PANEL: CPT

## 2025-06-02 PROCEDURE — 36415 COLL VENOUS BLD VENIPUNCTURE: CPT

## 2025-06-02 PROCEDURE — 85027 COMPLETE CBC AUTOMATED: CPT

## 2025-06-04 ENCOUNTER — PATIENT OUTREACH (OUTPATIENT)
Dept: CARE COORDINATION | Facility: CLINIC | Age: 60
End: 2025-06-04
Payer: COMMERCIAL

## 2025-06-05 DIAGNOSIS — Z12.11 COLON CANCER SCREENING: ICD-10-CM

## 2025-06-19 ENCOUNTER — ORDERS ONLY (AUTO-RELEASED) (OUTPATIENT)
Dept: URGENT CARE | Facility: CLINIC | Age: 60
End: 2025-06-19
Payer: COMMERCIAL

## 2025-06-19 DIAGNOSIS — Z12.11 COLON CANCER SCREENING: ICD-10-CM

## 2025-07-23 ENCOUNTER — TELEPHONE (OUTPATIENT)
Dept: INTERNAL MEDICINE | Facility: CLINIC | Age: 60
End: 2025-07-23
Payer: COMMERCIAL

## 2025-07-23 NOTE — LETTER
July 23, 2025    Gracie Aranda    [address] [address]    Hello,     Your care team at [facility]  values your health and well-being. After reviewing your chart, we have identified recommendation(s) to help you better manage your health.    It's time for a follow-up visit to manage your Colon Cancer Screening    If you recently had or are having any of these services soon, please contact the clinic via phone or [facility] so that your care team can update your records.    We look forward to seeing you at your upcoming visit.    If you have any questions or concerns, please contact our clinic. Thank you for continuing to trust us with your care.    Sincerely,    Your [facility] Care Team            Electronically signed

## 2025-07-23 NOTE — TELEPHONE ENCOUNTER
Patient Quality Outreach    Patient is due for the following:   Colon Cancer Screening    Action(s) Taken:   Schedule a office visit for Colon Cancer Screening    Type of outreach:    Sent letter.    Questions for provider review:    None         Sheldon Garcia MA  Chart routed to None.